# Patient Record
Sex: MALE | Race: WHITE | NOT HISPANIC OR LATINO | Employment: FULL TIME | URBAN - METROPOLITAN AREA
[De-identification: names, ages, dates, MRNs, and addresses within clinical notes are randomized per-mention and may not be internally consistent; named-entity substitution may affect disease eponyms.]

---

## 2017-11-22 DIAGNOSIS — Z00.00 ENCOUNTER FOR GENERAL ADULT MEDICAL EXAMINATION WITHOUT ABNORMAL FINDINGS: ICD-10-CM

## 2017-11-22 DIAGNOSIS — Z12.5 ENCOUNTER FOR SCREENING FOR MALIGNANT NEOPLASM OF PROSTATE: ICD-10-CM

## 2017-11-22 DIAGNOSIS — E78.5 HYPERLIPIDEMIA: ICD-10-CM

## 2017-12-05 ENCOUNTER — ALLSCRIPTS OFFICE VISIT (OUTPATIENT)
Dept: OTHER | Facility: OTHER | Age: 52
End: 2017-12-05

## 2018-01-16 NOTE — PROGRESS NOTES
Assessment    1  Encounter for preventive health examination (V70 0) (Z00 00)   2  Encounter for screening colonoscopy (V76 51) (Z12 11)   3  Umbilical hernia without obstruction and without gangrene (553 1) (K42 9)    Plan  Encounter for screening colonoscopy    · 1 - Radha CARTER, Haydee May (Gastroenterology) Physician Referral  Consult  Status: Active   Requested for: 81INJ9853  Care Summary provided  : Yes  Health Maintenance    · Always use a seat belt and shoulder strap when riding or driving a motor vehicle ;  Status:Complete;   Done: 64KDE1625   · Regular aerobic exercise can help reduce stress ; Status:Complete;   Done: 59NAO9269   · Use a sun block product with an SPF of 15 or more ; Status:Complete;   Done:  41Fyb5741   · We recommend a colonoscopy ; Status:Complete;   Done: 84CJO4901   · We recommend routine visits to a dentist ; Status:Complete;   Done: 49UWP2533   · Call [phone] if: You have any warning signs of skin cancer ; Status:Complete;    Done: 03UWJ7614    Discussion/Summary  Impression: health maintenance visit  Currently, he eats a healthy diet  Prostate cancer screening: the risks and benefits of prostate cancer screening were discussed and prostate cancer screening is current  Testicular cancer screening: the risks and benefits of testicular cancer screening were discussed, monthly self testicular exam was advised and testicular cancer screening is current  Colorectal cancer screening: the risks and benefits of colorectal cancer screening were discussed and colonoscopy has been ordered  The immunizations are up to date  Advice and education were given regarding nutrition and sunscreen use  Patient discussion: discussed with the patient  REFER FOR COLONOSCOPY  MONITOR UMBILICAL HERNIA, TO ER WITH ANY S/S OF INCARCERATION     Chief Complaint  patient presents today for a wellness        History of Present Illness  HM, Adult Male: The patient is being seen for a health maintenance evaluation  The last health maintenance visit was 1 year(s) ago  Social History: Household members include spouse  He is   Work status: working full time  The patient has never smoked cigarettes  He reports occasional alcohol use  He has never used illicit drugs  General Health: The patient's health since the last visit is described as good  He has regular dental visits  He denies vision problems  He denies hearing loss  Immunizations status: up to date  Lifestyle:  He consumes a diverse and healthy diet  He does not have any weight concerns  He exercises regularly  He does not use tobacco  He consumes alcohol  He denies drug use  Reproductive health:  the patient is sexually active  birth control is not being practiced  He denies erectile dysfunction  Screening: Prostate cancer screening includes last prostate-specific antigen testing 9/2016  Testicular cancer screening includes monthly self testicular examinations  Colorectal cancer screening includes no previous screening  Metabolic screening includes lipid profile performed 9/2016, glucose screening performed 9/2016, thyroid function test performed 9/2016 and no previous DEXA  Safety elements used: seat belt and smoke detector  HPI: [address]   FEELS WELL   LABS REVIEWED WITH PT       Review of Systems    Constitutional: No fever or chills, feels well, no tiredness, no recent weight gain or weight loss  Eyes: No complaints of eye pain, no red eyes, no discharge from eyes, no itchy eyes  ENT: no complaints of earache, no hearing loss, no nosebleeds, no nasal discharge, no sore throat, no hoarseness  Cardiovascular: No complaints of slow heart rate, no fast heart rate, no chest pain, no palpitations, no leg claudication, no lower extremity  Respiratory: No complaints of shortness of breath, no wheezing, no cough, no SOB on exertion, no orthopnea or PND     Gastrointestinal: No complaints of abdominal pain, no constipation, no nausea or vomiting, no diarrhea or bloody stools  Genitourinary: No complaints of dysuria, no incontinence, no hesitancy, no nocturia, no genital lesion, no testicular pain  Musculoskeletal: No complaints of arthralgia, no myalgias, no joint swelling or stiffness, no limb pain or swelling  Integumentary: No complaints of skin rash or skin lesions, no itching, no skin wound, no dry skin  Neurological: No compliants of headache, no confusion, no convulsions, no numbness or tingling, no dizziness or fainting, no limb weakness, no difficulty walking  Psychiatric: Is not suicidal, no sleep disturbances, no anxiety or depression, no change in personality, no emotional problems  Endocrine: No complaints of proptosis, no hot flashes, no muscle weakness, no erectile dysfunction, no deepening of the voice, no feelings of weakness  Hematologic/Lymphatic: No complaints of swollen glands, no swollen glands in the neck, does not bleed easily, no easy bruising  Active Problems    1  Encounter for screening colonoscopy (V76 51) (Z12 11)   2  Hyperlipidemia (272 4) (E78 5)   3  Vitamin D deficiency (268 9) (E55 9)    Past Medical History    · History of acute sinusitis (V12 69) (Z87 09)   · History of peptic ulcer (V12 71) (Z87 11)    Surgical History    · History of Lipectomy    Family History  Mother    · No pertinent family history  Father    · Family history of cerebrovascular accident (V17 1) (Z82 3)    Social History    · Alcohol use   · Never a smoker   · Occasional alcohol use    Current Meds   1  No Reported Medications Recorded    Allergies    1  No Known Drug Allergies    2  No Known Environmental Allergies   3   No Known Food Allergies    Vitals   Recorded: 68NRM1057 72:54ED   Systolic 387   Diastolic 76   Heart Rate 69   Respiration 16   Temperature 98 5 F   O2 Saturation 97   Height 5 ft 9 1 in   Weight 188 lb 0 6 oz   BMI Calculated 27 69   BSA Calculated 2 01     Physical Exam    Constitutional   General appearance: No acute distress, well appearing and well nourished  Head and Face   Head and face: Normal     Eyes   Conjunctiva and lids: No erythema, swelling or discharge  Pupils and irises: Equal, round, reactive to light  Ears, Nose, Mouth, and Throat   External inspection of ears and nose: Normal     Otoscopic examination: Tympanic membranes translucent with normal light reflex  Canals patent without erythema  Nasal mucosa, septum, and turbinates: Normal without edema or erythema  Lips, teeth, and gums: Normal, good dentition  Oropharynx: Normal with no erythema, edema, exudate or lesions  Neck   Neck: Supple, symmetric, trachea midline, no masses  Thyroid: Normal, no thyromegaly  Pulmonary   Respiratory effort: No increased work of breathing or signs of respiratory distress  Auscultation of lungs: Clear to auscultation  Cardiovascular   Auscultation of heart: Normal rate and rhythm, normal S1 and S2, no murmurs  Carotid pulses: 2+ bilaterally  Examination of extremities for edema and/or varicosities: Normal     Chest   Chest: Normal     Abdomen   Abdomen: Non-tender, no masses  Liver and spleen: No hepatomegaly or splenomegaly  Examination for hernias: Abnormal   A(n) reducible umbilical hernia was palpated  Lymphatic   Palpation of lymph nodes in neck: No lymphadenopathy  Palpation of lymph nodes in axillae: No lymphadenopathy  Musculoskeletal   Gait and station: Normal     Inspection/palpation of digits and nails: Normal without clubbing or cyanosis  Inspection/palpation of joints, bones, and muscles: Normal     Range of motion: Normal     Stability: Normal     Muscle strength/tone: Normal     Skin   Skin and subcutaneous tissue: Normal without rashes or lesions  Palpation of skin and subcutaneous tissue: Normal turgor  Neurologic   Cranial nerves: Cranial nerves 2-12 intact      Cortical function: Normal mental status  Reflexes: 2+ and symmetric  Sensation: No sensory loss  Coordination: Normal finger to nose and heel to shin  Psychiatric   Judgment and insight: Normal     Orientation to person, place and time: Normal     Recent and remote memory: Intact  Mood and affect: Normal        Results/Data  PHQ-2 Adult Depression Screening [id number] 04:04PM User, s     Test Name Result Flag Reference   PHQ-2 Adult Depression Score 0     Over the last two weeks, how often have you been bothered by any of the following problems?   Little interest or pleasure in doing things: Not at all - 0  Feeling down, depressed, or hopeless: Not at all - 0   PHQ-2 Adult Depression Screening Negative         Signatures   Electronically signed by : PRASANNA Hong; Sep 22 2016  9:22PM EST                       (Author)    Electronically signed by : BRENDA Marroquin ; Sep 22 2016 10:05PM EST                       (Author)

## 2018-01-16 NOTE — RESULT NOTES
Verified Results  (1) TISSUE EXAM [id number] 07:51AM Albertina Rojo     Test Name Result Flag Reference   LAB AP CASE REPORT (Report)     Surgical Pathology Report             Case: [id number]                   Authorizing Provider: Yuridia Huertas MD      Collected:      11/14/2016 0751        Ordering Location:   [facility]    Received:      11/15/2016 [address] [facility] [address] Endoscopy                            Pathologist:      Daniel Dooley MD                                 Specimen:  Large Intestine, Cecum, cold bx, cecum polyp   LAB AP FINAL DIAGNOSIS      A  Cecum polyp (biopsy):  - Tubular adenoma  - No high grade dysplasia    Interpretation performed at [place] [address]    Electronically signed by Daniel Dooley MD on 11/17/2016 at 3:49 PM   LAB AP SURGICAL ADDITIONAL INFORMATION (Report)     These tests were developed and their performance characteristics   determined by [facility] or [facility]  They may not be cleared or approved by the [place]  Food and   Drug Administration  The FDA has determined that such clearance or   approval is not necessary  These tests are used for clinical purposes  They should not be regarded as investigational or for research  This   laboratory has been approved by [place] 88, designated as a high-complexity   laboratory and is qualified to perform these tests  LAB AP GROSS DESCRIPTION (Report)     A  The specimen is received in formalin, labeled with the patient's name   and medical record number, and is designated cecum polyp biopsy  The   specimen consists of one tan-pink soft tissue fragment measuring 0 3 cm in   greatest dimension  Entirely submitted  One cassette  Note: The estimated total formalin fixation time based upon information   provided by the submitting clinician and the standard processing schedule   is 44 75 hours    MAS

## 2018-01-23 VITALS
RESPIRATION RATE: 16 BRPM | OXYGEN SATURATION: 99 % | WEIGHT: 190.01 LBS | DIASTOLIC BLOOD PRESSURE: 70 MMHG | SYSTOLIC BLOOD PRESSURE: 128 MMHG | HEIGHT: 69 IN | HEART RATE: 68 BPM | BODY MASS INDEX: 28.14 KG/M2

## 2018-06-01 ENCOUNTER — TELEPHONE (OUTPATIENT)
Dept: INTERNAL MEDICINE CLINIC | Facility: CLINIC | Age: 53
End: 2018-06-01
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
## 2018-06-04 ENCOUNTER — APPOINTMENT (OUTPATIENT)
Dept: LAB | Facility: CLINIC | Age: 53
End: 2018-06-04
Payer: COMMERCIAL

## 2018-06-04 ENCOUNTER — TRANSCRIBE ORDERS (OUTPATIENT)
Dept: LAB | Facility: CLINIC | Age: 53
End: 2018-06-04
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
## 2018-06-04 PROCEDURE — 36415 COLL VENOUS BLD VENIPUNCTURE: CPT

## 2018-06-04 PROCEDURE — 86617 LYME DISEASE ANTIBODY: CPT

## 2018-06-06 LAB
B BURGDOR IGG PATRN SER IB-IMP: NEGATIVE
B BURGDOR IGM PATRN SER IB-IMP: NEGATIVE
B BURGDOR18KD IGG SER QL IB: ABNORMAL
B BURGDOR23KD IGG SER QL IB: ABNORMAL
B BURGDOR23KD IGM SER QL IB: ABNORMAL
B BURGDOR28KD IGG SER QL IB: ABNORMAL
B BURGDOR30KD IGG SER QL IB: ABNORMAL
B BURGDOR39KD IGG SER QL IB: ABNORMAL
B BURGDOR39KD IGM SER QL IB: ABNORMAL
B BURGDOR41KD IGG SER QL IB: PRESENT
B BURGDOR41KD IGM SER QL IB: ABNORMAL
B BURGDOR45KD IGG SER QL IB: ABNORMAL
B BURGDOR58KD IGG SER QL IB: ABNORMAL
B BURGDOR66KD IGG SER QL IB: ABNORMAL
B BURGDOR93KD IGG SER QL IB: ABNORMAL

## 2018-09-09 ENCOUNTER — OFFICE VISIT (OUTPATIENT)
Dept: URGENT CARE | Facility: MEDICAL CENTER | Age: 53
End: 2018-09-09
Payer: COMMERCIAL

## 2018-09-09 VITALS
TEMPERATURE: 98.5 F | HEART RATE: 72 BPM | HEIGHT: 69 IN | DIASTOLIC BLOOD PRESSURE: 80 MMHG | SYSTOLIC BLOOD PRESSURE: 112 MMHG | BODY MASS INDEX: 26.96 KG/M2 | RESPIRATION RATE: 16 BRPM | WEIGHT: 182 LBS

## 2018-09-09 DIAGNOSIS — H60.311 ACUTE DIFFUSE OTITIS EXTERNA OF RIGHT EAR: Primary | ICD-10-CM

## 2018-09-09 PROCEDURE — G0382 LEV 3 HOSP TYPE B ED VISIT: HCPCS | Performed by: PHYSICIAN ASSISTANT

## 2018-09-09 RX ORDER — NEOMYCIN SULFATE, POLYMYXIN B SULFATE AND HYDROCORTISONE 10; 3.5; 1 MG/ML; MG/ML; [USP'U]/ML
4 SUSPENSION/ DROPS AURICULAR (OTIC) 4 TIMES DAILY
Qty: 10 ML | Refills: 0 | Status: SHIPPED | OUTPATIENT
Start: 2018-09-09 | End: 2019-09-18 | Stop reason: ALTCHOICE

## 2018-09-09 NOTE — PATIENT INSTRUCTIONS
1  Use cortisporin 2-3 drops into right ear 4x daily x 7 days  2  Motrin as needed for pain  3   Follow up with PCP in 3-5 days if symptoms persist

## 2018-09-09 NOTE — PROGRESS NOTES
[facility]        NAME: Rm Wise is a 47 y o  male  : 1965    MRN: [id number]  DATE: 2018  TIME: 10:06 AM    Assessment and Plan   Acute diffuse otitis externa of right ear [H60 311]  1  Acute diffuse otitis externa of right ear  neomycin-polymyxin-hydrocortisone (CORTISPORIN) 0 35%-10,000 units/mL-1% otic suspension         Patient Instructions     1  Use cortisporin 2-3 drops into right ear 4x daily x 7 days  2  Motrin as needed for pain  3  Follow up with PCP in 3-5 days if symptoms persist       Chief Complaint     Chief Complaint   Patient presents with    Earache     [place], next day R ear pain, started Sudafed and ear drops from pharmacy  Getting worse  History of Present Illness       The patient is a 50-year-old male presents with the right-sided ear pain x4 days  He denies any fever or ear discharge, he states his symptoms [place]  Review of Systems   Review of Systems   Constitutional: Negative  HENT: Positive for ear pain  Negative for ear discharge  Respiratory: Negative  Gastrointestinal: Negative            Current Medications       Current Outpatient Prescriptions:     neomycin-polymyxin-hydrocortisone (CORTISPORIN) 0 35%-10,000 units/mL-1% otic suspension, Administer 4 drops to the right ear 4 (four) times a day for 7 days, Disp: 10 mL, Rfl: 0    Current Allergies     Allergies as of 2018    (No Known Allergies)            The following portions of the patient's history were reviewed and updated as appropriate: allergies, current medications, past family history, past medical history, past social history, past surgical history and problem list      Past Medical History:   Diagnosis Date    Hyperlipidemia     Peptic ulcer     LAST ASSESSED: 14    Vitamin D deficiency     LAST ASSESSED: 9/17/15       Past Surgical History:   Procedure Laterality Date    BACK SURGERY      LIPECTOMY      L5; LAST ASSESSED: 9/11/14    MN COLONOSCOPY FLX DX W/COLLJ SPEC WHEN PFRMD N/A 11/14/2016    Procedure: COLONOSCOPY;  Surgeon: Dillan Guadarrama MD;  Location: [facility]; Service: Gastroenterology       Family History   Problem Relation Age of Onset    Stroke Father         CEREBROVASCULAR ACCIDENT         Medications have been verified  Objective   /80 (Patient Position: Sitting)   Pulse 72   Temp 98 5 °F (36 9 °C) (Tympanic)   Resp 16   Ht 5' 9" (1 753 m)   Wt 82 6 kg (182 lb)   BMI 26 88 kg/m²        Physical Exam     Physical Exam   Constitutional: He appears well-developed and well-nourished  No distress  HENT:   Head: Normocephalic and atraumatic  Left Ear: Tympanic membrane normal    Ears:    Nose: Nose normal    Mouth/Throat: Uvula is midline, oropharynx is clear and moist and mucous membranes are normal    Cardiovascular: Normal rate, regular rhythm and normal heart sounds  No murmur heard    Pulmonary/Chest: Effort normal and breath sounds normal

## 2018-09-11 ENCOUNTER — OFFICE VISIT (OUTPATIENT)
Dept: INTERNAL MEDICINE CLINIC | Facility: CLINIC | Age: 53
End: 2018-09-11
Payer: COMMERCIAL

## 2018-09-11 VITALS
HEIGHT: 69 IN | OXYGEN SATURATION: 98 % | RESPIRATION RATE: 16 BRPM | DIASTOLIC BLOOD PRESSURE: 78 MMHG | BODY MASS INDEX: 26.96 KG/M2 | SYSTOLIC BLOOD PRESSURE: 128 MMHG | WEIGHT: 182 LBS | HEART RATE: 82 BPM

## 2018-09-11 DIAGNOSIS — H66.90 ACUTE OTITIS MEDIA, UNSPECIFIED OTITIS MEDIA TYPE: Primary | ICD-10-CM

## 2018-09-11 PROCEDURE — 99213 OFFICE O/P EST LOW 20 MIN: CPT | Performed by: NURSE PRACTITIONER

## 2018-09-11 PROCEDURE — 3008F BODY MASS INDEX DOCD: CPT | Performed by: NURSE PRACTITIONER

## 2018-09-11 RX ORDER — METHYLPREDNISOLONE 4 MG/1
TABLET ORAL
Qty: 21 TABLET | Refills: 0 | Status: SHIPPED | OUTPATIENT
Start: 2018-09-11 | End: 2018-11-13 | Stop reason: ALTCHOICE

## 2018-09-11 RX ORDER — AMOXICILLIN AND CLAVULANATE POTASSIUM 875; 125 MG/1; MG/1
1 TABLET, FILM COATED ORAL EVERY 12 HOURS SCHEDULED
Qty: 14 TABLET | Refills: 0 | Status: SHIPPED | OUTPATIENT
Start: 2018-09-11 | End: 2018-09-18

## 2018-09-11 NOTE — PROGRESS NOTES
Assessment/Plan:    No problem-specific Assessment & Plan notes found for this encounter  Diagnoses and all orders for this visit:    Acute otitis media, unspecified otitis media type  -     amoxicillin-clavulanate (AUGMENTIN) 875-125 mg per tablet; Take 1 tablet by mouth every 12 (twelve) hours for 7 days  -     Methylprednisolone 4 MG TBPK; Use as directed on package          Subjective:      Patient ID: Val Cuevas is a 47 y o  male  Last Wednesday started with ear ache  Tried swimmers ear drop    Pain radiating to neck and head  Prescribed cortisporin ear drops not working        The following portions of the patient's history were reviewed and updated as appropriate: allergies, current medications, past family history, past medical history, past social history, past surgical history and problem list     Review of Systems   Constitutional: Negative  HENT: Positive for ear pain  Eyes: Negative  Respiratory: Negative  Cardiovascular: Negative  Gastrointestinal: Negative  Musculoskeletal: Negative  Neurological: Negative  Objective:      /78 (BP Location: Left arm, Patient Position: Sitting, Cuff Size: Standard)   Pulse 82   Resp 16   Ht 5' 9" (1 753 m)   Wt 82 6 kg (182 lb)   SpO2 98%   BMI 26 88 kg/m²          Physical Exam   Constitutional: He is oriented to person, place, and time  He appears well-developed and well-nourished  HENT:   Head: Normocephalic and atraumatic  Eyes: Conjunctivae are normal  Pupils are equal, round, and reactive to light  Neck: Normal range of motion  Neck supple  Cardiovascular: Normal rate and regular rhythm  Pulmonary/Chest: Effort normal and breath sounds normal    Abdominal: Soft  Bowel sounds are normal    Musculoskeletal: Normal range of motion  Neurological: He is alert and oriented to person, place, and time  Skin: Skin is warm and dry

## 2018-09-14 ENCOUNTER — TELEPHONE (OUTPATIENT)
Dept: INTERNAL MEDICINE CLINIC | Facility: CLINIC | Age: 53
End: 2018-09-14

## 2018-09-14 DIAGNOSIS — H92.01 RIGHT EAR PAIN: Primary | ICD-10-CM

## 2018-09-14 NOTE — TELEPHONE ENCOUNTER
Pt called in stating that the pain and redness in the ear canal has since stopped however he still cannot hear  He wants to know what he can do for that or will finishing the antibiotic help with that?  Please advise, ty

## 2018-11-13 ENCOUNTER — OFFICE VISIT (OUTPATIENT)
Dept: INTERNAL MEDICINE CLINIC | Facility: CLINIC | Age: 53
End: 2018-11-13
Payer: COMMERCIAL

## 2018-11-13 VITALS
OXYGEN SATURATION: 97 % | DIASTOLIC BLOOD PRESSURE: 92 MMHG | BODY MASS INDEX: 26.88 KG/M2 | HEART RATE: 88 BPM | HEIGHT: 69 IN | SYSTOLIC BLOOD PRESSURE: 140 MMHG

## 2018-11-13 DIAGNOSIS — J01.00 ACUTE NON-RECURRENT MAXILLARY SINUSITIS: Primary | ICD-10-CM

## 2018-11-13 PROCEDURE — 1036F TOBACCO NON-USER: CPT | Performed by: NURSE PRACTITIONER

## 2018-11-13 PROCEDURE — 99214 OFFICE O/P EST MOD 30 MIN: CPT | Performed by: NURSE PRACTITIONER

## 2018-11-13 RX ORDER — AMOXICILLIN AND CLAVULANATE POTASSIUM 875; 125 MG/1; MG/1
1 TABLET, FILM COATED ORAL EVERY 12 HOURS SCHEDULED
Qty: 14 TABLET | Refills: 0 | Status: SHIPPED | OUTPATIENT
Start: 2018-11-13 | End: 2018-11-20

## 2018-11-13 NOTE — PROGRESS NOTES
Assessment/Plan:     Diagnoses and all orders for this visit:    Acute non-recurrent maxillary sinusitis  -     amoxicillin-clavulanate (AUGMENTIN) 875-125 mg per tablet; Take 1 tablet by mouth every 12 (twelve) hours for 7 days        Drink plenty of fluids and rest  May take over the counter mucinex and cough syrup/cough drops  Call if worsening  Subjective:      Patient ID: Misa Self is a 47 y o  male  Sinusitis   This is a new problem  The current episode started in the past 7 days  The problem is unchanged  There has been no fever  Associated symptoms include congestion, sinus pressure and a sore throat  The following portions of the patient's history were reviewed and updated as appropriate: allergies, current medications, past family history, past medical history, past social history, past surgical history and problem list     Review of Systems   Constitutional: Negative  HENT: Positive for congestion, sinus pain, sinus pressure and sore throat  Eyes: Negative  Respiratory: Negative  Cardiovascular: Negative  Gastrointestinal: Negative  Musculoskeletal: Negative  Neurological: Negative  Family History   Problem Relation Age of Onset    Stroke Father         CEREBROVASCULAR ACCIDENT     Past Medical History:   Diagnosis Date    Hyperlipidemia     Peptic ulcer     LAST ASSESSED: 9/11/14    Vitamin D deficiency     LAST ASSESSED: 9/17/15     Social History     Social History    Marital status: /Civil Union     Spouse name: N/A    Number of children: N/A    Years of education: N/A     Occupational History    Not on file       Social History Main Topics    Smoking status: Never Smoker    Smokeless tobacco: Never Used    Alcohol use Yes      Comment: occasional     Drug use: No    Sexual activity: Not on file     Other Topics Concern    Not on file     Social History Narrative    No narrative on file       Current Outpatient Prescriptions:    amoxicillin-clavulanate (AUGMENTIN) 875-125 mg per tablet, Take 1 tablet by mouth every 12 (twelve) hours for 7 days, Disp: 14 tablet, Rfl: 0    neomycin-polymyxin-hydrocortisone (CORTISPORIN) 0 35%-10,000 units/mL-1% otic suspension, Administer 4 drops to the right ear 4 (four) times a day for 7 days, Disp: 10 mL, Rfl: 0  No Known Allergies      Objective:      /92 (BP Location: Left arm, Patient Position: Sitting, Cuff Size: Large)   Pulse 88   Ht 5' 9" (1 753 m)   SpO2 97%   BMI 26 88 kg/m²          Physical Exam   Constitutional: He is oriented to person, place, and time  He appears well-developed and well-nourished  HENT:   Head: Normocephalic and atraumatic  Eyes: Pupils are equal, round, and reactive to light  Conjunctivae are normal    Neck: Normal range of motion  Neck supple  Cardiovascular: Normal rate and regular rhythm  Pulmonary/Chest: Effort normal and breath sounds normal    Abdominal: Soft  Bowel sounds are normal    Musculoskeletal: Normal range of motion  Neurological: He is alert and oriented to person, place, and time  Skin: Skin is warm and dry

## 2018-12-06 ENCOUNTER — OFFICE VISIT (OUTPATIENT)
Dept: INTERNAL MEDICINE CLINIC | Facility: CLINIC | Age: 53
End: 2018-12-06
Payer: COMMERCIAL

## 2018-12-06 VITALS
DIASTOLIC BLOOD PRESSURE: 80 MMHG | TEMPERATURE: 98.3 F | HEART RATE: 72 BPM | BODY MASS INDEX: 27.56 KG/M2 | SYSTOLIC BLOOD PRESSURE: 126 MMHG | RESPIRATION RATE: 12 BRPM | WEIGHT: 186.6 LBS | OXYGEN SATURATION: 99 %

## 2018-12-06 DIAGNOSIS — Z13.6 SCREENING FOR CARDIOVASCULAR CONDITION: Primary | ICD-10-CM

## 2018-12-06 DIAGNOSIS — E55.9 VITAMIN D DEFICIENCY: ICD-10-CM

## 2018-12-06 DIAGNOSIS — Z12.5 SCREENING PSA (PROSTATE SPECIFIC ANTIGEN): ICD-10-CM

## 2018-12-06 DIAGNOSIS — Z00.00 WELLNESS EXAMINATION: ICD-10-CM

## 2018-12-06 PROCEDURE — 99396 PREV VISIT EST AGE 40-64: CPT | Performed by: INTERNAL MEDICINE

## 2018-12-06 NOTE — ASSESSMENT & PLAN NOTE
Assessment and plan 1  Health maintenance annual wellness examination overall the patient is clinically stable and doing well, we encouraged the patient to follow a healthy and balanced diet  We recommend that the patient exercise routinely approximately 30 minutes 5 times per week   We have reviewed the patient's vaccines and have made recommendations for updates if necessary pt wants to hold off on the flu shot , we did  the pt       We will be ordering screening laboratories which are age appropriate  Return to the office in 12months     call if any problems

## 2018-12-06 NOTE — PROGRESS NOTES
NAME: Abdelrahman Miller  AGE: 47 y o  SEX: male  : 1965     DATE: 2018    Assessment and Plan     Problem List Items Addressed This Visit        Other    Wellness examination     Assessment and plan 1  Health maintenance annual wellness examination overall the patient is clinically stable and doing well, we encouraged the patient to follow a healthy and balanced diet  We recommend that the patient exercise routinely approximately 30 minutes 5 times per week   We have reviewed the patient's vaccines and have made recommendations for updates if necessary pt wants to hold off on the flu shot , we did  the pt       We will be ordering screening laboratories which are age appropriate  Return to the office in 12months     call if any problems  Other Visit Diagnoses     Screening for cardiovascular condition    -  Primary    Relevant Orders    Comprehensive metabolic panel    Lipid Panel with Direct LDL reflex    Screening PSA (prostate specific antigen)        Relevant Orders    PSA, Total Screen    Vitamin D deficiency        Relevant Orders    Vitamin D 25 hydroxy        Sept camping double ear infection and just getting over sinus infection; he limits red meats ,  In the past low vit d level     · Patient Counseling:   · Nutrition: Stressed importance of a well balanced diet, moderation of sodium/saturated fat, caloric balance and sufficient intake of fiber  · Exercise: Stressed the importance of regular exercise with a goal of 150 minutes per week walking , weight lifting 4/week , and active life style  Dental Health: Discussed daily flossing and brushing and regular dental visits     · Immunizations reviewed y  · Discussed benefits of screening yes  · Discussed the patient's BMI with him  The BMI is above average; BMI management plan is completed     No Follow-up on file      Return to office 12  months  call if any problems    Chief Complaint     Chief Complaint   Patient presents with    Annual Exam       History of Present Illness     HPI    Well Adult Physical   Patient here for a comprehensive physical exam       Diet and Physical Activity  Diet: well balanced diet  Weight concerns: Patient is overweight (BMI 25 0-29  9)  Exercise: frequently      Depression Screen  PHQ-9 Depression Screening    PHQ-9:    Frequency of the following problems over the past two weeks:               General Health  Hearing: Normal:  Vision: no vision problems  Dental: regular dental visits    Reproductive Health        The following portions of the patient's history were reviewed and updated as appropriate: allergies, current medications, past family history, past medical history, past social history, past surgical history and problem list     Review of Systems     Review of Systems    Past Medical History     Past Medical History:   Diagnosis Date    Hyperlipidemia     Peptic ulcer     LAST ASSESSED: 9/11/14    Vitamin D deficiency     LAST ASSESSED: 9/17/15       Past Surgical History     Past Surgical History:   Procedure Laterality Date    BACK SURGERY      LIPECTOMY      L5; LAST ASSESSED: 9/11/14    OR COLONOSCOPY FLX DX W/COLLJ SPEC WHEN PFRMD N/A 11/14/2016    Procedure: COLONOSCOPY;  Surgeon: Ada Barreto MD;  Location: [facility];   Service: Gastroenterology       Social History     Social History     Social History    Marital status: /Civil Union     Spouse name: N/A    Number of children: N/A    Years of education: N/A     Social History Main Topics    Smoking status: Never Smoker    Smokeless tobacco: Never Used    Alcohol use Yes      Comment: occasional     Drug use: No    Sexual activity: Not on file     Other Topics Concern    Not on file     Social History Narrative    No narrative on file       Family History     Family History   Problem Relation Age of Onset    Stroke Father         CEREBROVASCULAR ACCIDENT       Current Medications       Current Outpatient Prescriptions:     neomycin-polymyxin-hydrocortisone (CORTISPORIN) 0 35%-10,000 units/mL-1% otic suspension, Administer 4 drops to the right ear 4 (four) times a day for 7 days, Disp: 10 mL, Rfl: 0     Allergies     No Known Allergies    Objective     /80 (BP Location: Left arm, Patient Position: Sitting, Cuff Size: Standard)   Pulse 72   Temp 98 3 °F (36 8 °C) (Oral)   Resp 12   Wt 84 6 kg (186 lb 9 6 oz)   SpO2 99%   BMI 27 56 kg/m²      Physical Exam   Constitutional: He appears well-developed and well-nourished  No distress  HENT:   Head: Normocephalic and atraumatic  Right Ear: External ear normal    Left Ear: External ear normal    Mouth/Throat: Oropharynx is clear and moist    Eyes: Pupils are equal, round, and reactive to light  Conjunctivae are normal  Right eye exhibits no discharge  Left eye exhibits no discharge  No scleral icterus  Neck: Neck supple  Cardiovascular: Normal rate, regular rhythm and normal heart sounds  Exam reveals no gallop and no friction rub  No murmur heard  Pulmonary/Chest: No respiratory distress  He has no wheezes  He has no rales  Abdominal: Soft  Bowel sounds are normal  He exhibits no distension and no mass  There is no tenderness  There is no rebound and no guarding  Musculoskeletal: He exhibits no edema or deformity  Lymphadenopathy:     He has no cervical adenopathy  Neurological: He is alert  Skin: He is not diaphoretic  Psychiatric: He has a normal mood and affect           No exam data present    Health Maintenance     Health Maintenance   Topic Date Due    INFLUENZA VACCINE  12/06/2019 (Originally 7/1/2018)    Depression Screening PHQ  09/09/2019    CRC Screening: Colonoscopy  11/14/2021    DTaP,Tdap,and Td Vaccines (2 - Td) 12/05/2027     Immunization History   Administered Date(s) Administered    Influenza Quadrivalent Preservative Free 3 years and older IM 12/05/2017    Influenza TIV (IM) 03/13/2011    Tdap 12/05/2017 Marisel Montgomery DO  [facility]

## 2018-12-19 DIAGNOSIS — R97.20 ELEVATED PSA: ICD-10-CM

## 2018-12-19 DIAGNOSIS — R79.89 LOW VITAMIN D LEVEL: Primary | ICD-10-CM

## 2019-05-02 ENCOUNTER — OFFICE VISIT (OUTPATIENT)
Dept: UROLOGY | Facility: AMBULATORY SURGERY CENTER | Age: 54
End: 2019-05-02
Payer: COMMERCIAL

## 2019-05-02 VITALS
DIASTOLIC BLOOD PRESSURE: 82 MMHG | SYSTOLIC BLOOD PRESSURE: 130 MMHG | BODY MASS INDEX: 25.86 KG/M2 | HEIGHT: 70 IN | HEART RATE: 76 BPM | WEIGHT: 180.6 LBS

## 2019-05-02 DIAGNOSIS — R97.20 ELEVATED PSA: ICD-10-CM

## 2019-05-02 PROCEDURE — 99204 OFFICE O/P NEW MOD 45 MIN: CPT | Performed by: UROLOGY

## 2019-05-02 RX ORDER — CHOLECALCIFEROL (VITAMIN D3) 50 MCG
2000 TABLET ORAL DAILY
COMMUNITY

## 2019-09-18 ENCOUNTER — OFFICE VISIT (OUTPATIENT)
Dept: INTERNAL MEDICINE CLINIC | Facility: CLINIC | Age: 54
End: 2019-09-18
Payer: COMMERCIAL

## 2019-09-18 VITALS
BODY MASS INDEX: 26.11 KG/M2 | RESPIRATION RATE: 16 BRPM | TEMPERATURE: 100.1 F | OXYGEN SATURATION: 95 % | DIASTOLIC BLOOD PRESSURE: 80 MMHG | HEIGHT: 70 IN | HEART RATE: 96 BPM | SYSTOLIC BLOOD PRESSURE: 128 MMHG | WEIGHT: 182.4 LBS

## 2019-09-18 DIAGNOSIS — J01.00 ACUTE NON-RECURRENT MAXILLARY SINUSITIS: Primary | ICD-10-CM

## 2019-09-18 PROCEDURE — 99213 OFFICE O/P EST LOW 20 MIN: CPT | Performed by: NURSE PRACTITIONER

## 2019-09-18 RX ORDER — AMOXICILLIN AND CLAVULANATE POTASSIUM 875; 125 MG/1; MG/1
1 TABLET, FILM COATED ORAL EVERY 12 HOURS SCHEDULED
Qty: 14 TABLET | Refills: 0 | Status: SHIPPED | OUTPATIENT
Start: 2019-09-18 | End: 2019-09-25

## 2019-09-18 NOTE — PROGRESS NOTES
Assessment/Plan:    Acute non-recurrent maxillary sinusitis  Drink plenty of fluids and rest  May take over the counter mucinex and cough syrup/cough drops  Call if worsening  Start antibiotics       Diagnoses and all orders for this visit:    Acute non-recurrent maxillary sinusitis  -     amoxicillin-clavulanate (AUGMENTIN) 875-125 mg per tablet; Take 1 tablet by mouth every 12 (twelve) hours for 7 days    Other orders  -     Cancel: influenza vaccine, [id number], quadrivalent, recombinant, PF, 0 5 mL, for patients 18 yr+ (FLUBLOK)          Subjective:      Patient ID: Jordyn Hutchinson is a 49 y o  male  Sinusitis   This is a new problem  The current episode started in the past 7 days  The problem is unchanged  The maximum temperature recorded prior to his arrival was 100 4 - 100 9 F  The fever has been present for 1 to 2 days  The pain is mild  Associated symptoms include coughing, ear pain and sinus pressure  The following portions of the patient's history were reviewed and updated as appropriate: allergies, current medications, past family history, past medical history, past social history, past surgical history and problem list     Review of Systems   Constitutional: Positive for fatigue and fever  HENT: Positive for ear pain, postnasal drip, rhinorrhea, sinus pressure and sinus pain  Eyes: Negative  Respiratory: Positive for cough  Cardiovascular: Negative  Gastrointestinal: Negative  Musculoskeletal: Negative  Neurological: Negative  Objective:      /80   Pulse 96   Temp 100 1 °F (37 8 °C) (Tympanic)   Resp 16   Ht 5' 9 5" (1 765 m)   Wt 82 7 kg (182 lb 6 4 oz)   SpO2 95%   BMI 26 55 kg/m²          Physical Exam   Constitutional: He is oriented to person, place, and time  He appears well-developed and well-nourished  HENT:   Head: Normocephalic and atraumatic     Right Ear: External ear normal    Left Ear: External ear normal    Nose: Nose normal    Mouth/Throat: Oropharynx is clear and moist    Eyes: Pupils are equal, round, and reactive to light  Conjunctivae are normal    Neck: Normal range of motion  Neck supple  Cardiovascular: Normal rate and regular rhythm  Pulmonary/Chest: Effort normal and breath sounds normal    Abdominal: Soft  Bowel sounds are normal    Musculoskeletal: Normal range of motion  Neurological: He is alert and oriented to person, place, and time  Skin: Skin is warm and dry  Nursing note and vitals reviewed

## 2019-09-25 ENCOUNTER — TELEPHONE (OUTPATIENT)
Dept: INTERNAL MEDICINE CLINIC | Facility: CLINIC | Age: 54
End: 2019-09-25

## 2019-09-25 DIAGNOSIS — J06.9 UPPER RESPIRATORY TRACT INFECTION, UNSPECIFIED TYPE: Primary | ICD-10-CM

## 2019-09-25 RX ORDER — DOXYCYCLINE HYCLATE 100 MG/1
100 CAPSULE ORAL EVERY 12 HOURS SCHEDULED
Qty: 14 CAPSULE | Refills: 0 | Status: SHIPPED | OUTPATIENT
Start: 2019-09-25 | End: 2019-10-03 | Stop reason: ALTCHOICE

## 2019-09-25 NOTE — TELEPHONE ENCOUNTER
Patient was in to see you 9/18 for cold symptoms he completed medications and he is still having symptoms wanted to know what is the next step?     Please advise

## 2019-10-02 ENCOUNTER — TELEPHONE (OUTPATIENT)
Dept: INTERNAL MEDICINE CLINIC | Facility: CLINIC | Age: 54
End: 2019-10-02

## 2019-10-02 NOTE — TELEPHONE ENCOUNTER
Pt called and said his sinus infection is still not any better  He finished his second round of antibiotics and there is no change to his condition  Please advise

## 2019-10-03 ENCOUNTER — OFFICE VISIT (OUTPATIENT)
Dept: INTERNAL MEDICINE CLINIC | Facility: CLINIC | Age: 54
End: 2019-10-03
Payer: COMMERCIAL

## 2019-10-03 VITALS
HEIGHT: 70 IN | TEMPERATURE: 98.6 F | RESPIRATION RATE: 16 BRPM | WEIGHT: 180 LBS | BODY MASS INDEX: 25.77 KG/M2 | SYSTOLIC BLOOD PRESSURE: 114 MMHG | DIASTOLIC BLOOD PRESSURE: 78 MMHG | OXYGEN SATURATION: 97 % | HEART RATE: 63 BPM

## 2019-10-03 DIAGNOSIS — J30.2 SEASONAL ALLERGIC RHINITIS, UNSPECIFIED TRIGGER: Primary | ICD-10-CM

## 2019-10-03 DIAGNOSIS — Z23 NEED FOR INFLUENZA VACCINATION: ICD-10-CM

## 2019-10-03 PROCEDURE — 3008F BODY MASS INDEX DOCD: CPT | Performed by: NURSE PRACTITIONER

## 2019-10-03 PROCEDURE — 99213 OFFICE O/P EST LOW 20 MIN: CPT | Performed by: NURSE PRACTITIONER

## 2019-10-03 NOTE — PROGRESS NOTES
Assessment/Plan:    Seasonal allergic rhinitis  No sign of infection  Finished two rounds of antibiotics  Start allergie med ie claritin otc and flonase       Diagnoses and all orders for this visit:    Seasonal allergic rhinitis, unspecified trigger    Need for influenza vaccination    Other orders  -     Cancel: influenza vaccine, [id number], quadrivalent, recombinant, PF, 0 5 mL, for patients 18 yr+ (FLUBLOK)          Subjective:      Patient ID: Wilda Romo is a 49 y o  male  Patient is here for continuous sinus congestion and cough  Ear congestion  finished zpak and doxycycline  He no longer feels sick but still congested and has green phlegm in the morning      The following portions of the patient's history were reviewed and updated as appropriate: allergies, current medications, past family history, past medical history, past social history, past surgical history and problem list     Review of Systems   Constitutional: Negative  HENT: Positive for congestion, postnasal drip, rhinorrhea and sinus pressure  Eyes: Negative  Respiratory: Positive for cough  Cardiovascular: Negative  Gastrointestinal: Negative  Musculoskeletal: Negative  Neurological: Negative  Objective:      /78   Pulse 63   Temp 98 6 °F (37 °C) (Oral)   Resp 16   Ht 5' 9 5" (1 765 m)   Wt 81 6 kg (180 lb)   SpO2 97%   BMI 26 20 kg/m²          Physical Exam   Constitutional: He is oriented to person, place, and time  He appears well-developed and well-nourished  HENT:   Head: Normocephalic and atraumatic  Right Ear: External ear normal    Left Ear: External ear normal    Nose: Nose normal    Mouth/Throat: Oropharynx is clear and moist    Eyes: Pupils are equal, round, and reactive to light  Conjunctivae are normal    Neck: Normal range of motion  Neck supple  Cardiovascular: Normal rate and regular rhythm  Pulmonary/Chest: Effort normal and breath sounds normal    Abdominal: Soft   Bowel sounds are normal    Musculoskeletal: Normal range of motion  Neurological: He is alert and oriented to person, place, and time  Skin: Skin is warm and dry  Nursing note and vitals reviewed

## 2019-10-06 NOTE — ASSESSMENT & PLAN NOTE
No sign of infection  Finished two rounds of antibiotics  Start allergie med ie claritin otc and flonase

## 2019-12-09 ENCOUNTER — OFFICE VISIT (OUTPATIENT)
Dept: INTERNAL MEDICINE CLINIC | Facility: CLINIC | Age: 54
End: 2019-12-09
Payer: COMMERCIAL

## 2019-12-09 VITALS
RESPIRATION RATE: 18 BRPM | SYSTOLIC BLOOD PRESSURE: 118 MMHG | BODY MASS INDEX: 26.27 KG/M2 | HEIGHT: 70 IN | TEMPERATURE: 98.8 F | OXYGEN SATURATION: 99 % | WEIGHT: 183.5 LBS | HEART RATE: 69 BPM | DIASTOLIC BLOOD PRESSURE: 80 MMHG

## 2019-12-09 DIAGNOSIS — E55.9 VITAMIN D DEFICIENCY: ICD-10-CM

## 2019-12-09 DIAGNOSIS — Z00.00 WELLNESS EXAMINATION: ICD-10-CM

## 2019-12-09 DIAGNOSIS — Z13.6 SCREENING FOR CARDIOVASCULAR CONDITION: Primary | ICD-10-CM

## 2019-12-09 DIAGNOSIS — E78.2 MODERATE MIXED HYPERLIPIDEMIA NOT REQUIRING STATIN THERAPY: ICD-10-CM

## 2019-12-09 PROCEDURE — 99396 PREV VISIT EST AGE 40-64: CPT | Performed by: INTERNAL MEDICINE

## 2019-12-09 NOTE — PROGRESS NOTES
Assessment/Plan:    Wellness examination  Assessment and plan 1  Health maintenance annual wellness examination overall the patient is clinically stable and doing well, we encouraged the patient to follow a healthy and balanced diet  We recommend that the patient exercise routinely approximately 30 minutes 5 times per week   We have reviewed the patient's vaccines and have made recommendations for updates if necessary  recommend annual flu shot, consider the new shingles vaccine      We will be ordering screening laboratories which are age appropriate  Return to the office in 6 months    call if any problems  Problem List Items Addressed This Visit        Other    Wellness examination     Assessment and plan 1  Health maintenance annual wellness examination overall the patient is clinically stable and doing well, we encouraged the patient to follow a healthy and balanced diet  We recommend that the patient exercise routinely approximately 30 minutes 5 times per week   We have reviewed the patient's vaccines and have made recommendations for updates if necessary  recommend annual flu shot, consider the new shingles vaccine      We will be ordering screening laboratories which are age appropriate  Return to the office in 6 months    call if any problems  Other Visit Diagnoses     Screening for cardiovascular condition    -  Primary    Relevant Orders    Comprehensive metabolic panel    Moderate mixed hyperlipidemia not requiring statin therapy        Relevant Orders    Lipid Panel with Direct LDL reflex    High sensitivity CRP    Vitamin D deficiency        Relevant Orders    Vitamin D 25 hydroxy        BMI Counseling: Body mass index is 26 71 kg/m²  The BMI is above normal  Nutrition recommendations include decreasing portion sizes  Exercise recommendations include exercising 3-5 times per week  Subjective:      Patient ID: Trice Appiah is a 49 y o  male      [facility] annual wellness examination, he attends routine dental examinations, eye examinations routinely, wears a seatbelt and drives a car safely  He does have a smoke alarm, carbon monoxide detector and also fire extinguisher within his house  He sees Dermatology annually for routine skin examination  Diet good,  Limit red meats eats salmon, vegetable , water adequate; exercize walking 3-4 tiems per week and weights 3 times per week and now biking    The following portions of the patient's history were reviewed and updated as appropriate: allergies, current medications, past family history, past medical history, past social history, past surgical history and problem list     Review of Systems   Constitutional: Negative for activity change, appetite change and unexpected weight change  HENT: Negative for congestion and postnasal drip  Eyes: Negative for visual disturbance  Respiratory: Negative for cough and shortness of breath  Cardiovascular: Negative for chest pain  Gastrointestinal: Negative for abdominal pain, diarrhea, nausea and vomiting  Neurological: Negative for dizziness, light-headedness and headaches  Hematological: Negative for adenopathy  Objective:    No follow-ups on file  No results found  No Known Allergies    Past Medical History:   Diagnosis Date    Hyperlipidemia     Peptic ulcer     LAST ASSESSED: 9/11/14    Vitamin D deficiency     LAST ASSESSED: 9/17/15     Past Surgical History:   Procedure Laterality Date    BACK SURGERY      LIPECTOMY      L5; LAST ASSESSED: 9/11/14    FL COLONOSCOPY FLX DX W/COLLJ SPEC WHEN PFRMD N/A 11/14/2016    Procedure: COLONOSCOPY;  Surgeon: Rivka Pacheco MD;  Location: [facility];   Service: Gastroenterology     Current Outpatient Medications on File Prior to Visit   Medication Sig Dispense Refill    Cholecalciferol (VITAMIN D) 2000 units tablet Take 2,000 Units by mouth daily       No current facility-administered medications on file prior to visit        Family History   Problem Relation Age of Onset    Stroke Father         CEREBROVASCULAR ACCIDENT     Social History     Socioeconomic History    Marital status: /Civil Union     Spouse name: Not on file    Number of children: Not on file    Years of education: Not on file    Highest education level: Not on file   Occupational History    Not on file   Social Needs    Financial resource strain: Not on file    Food insecurity:     Worry: Not on file     Inability: Not on file    Transportation needs:     Medical: Not on file     Non-medical: Not on file   Tobacco Use    Smoking status: Never Smoker    Smokeless tobacco: Never Used   Substance and Sexual Activity    Alcohol use: Yes     Frequency: 2-4 times a month     Drinks per session: 3 or 4     Comment: occasional     Drug use: No    Sexual activity: Not on file   Lifestyle    Physical activity:     Days per week: Not on file     Minutes per session: Not on file    Stress: Not on file   Relationships    Social connections:     Talks on phone: Not on file     Gets together: Not on file     Attends [employer] service: Not on file     Active member of club or organization: Not on file     Attends meetings of clubs or organizations: Not on file     Relationship status: Not on file    Intimate partner violence:     Fear of current or ex partner: Not on file     Emotionally abused: Not on file     Physically abused: Not on file     Forced sexual activity: Not on file   Other Topics Concern    Not on file   Social History Narrative    Not on file     Vitals:    12/09/19 0759   BP: 118/80   BP Location: Left arm   Patient Position: Sitting   Cuff Size: Adult   Pulse: 69   Resp: 18   Temp: 98 8 °F (37 1 °C)   TempSrc: Oral   SpO2: 99%   Weight: 83 2 kg (183 lb 8 oz)   Height: 5' 9 5" (1 765 m)     Results for orders placed or performed in visit on 06/04/18   Lyme disease, western blot   Result Value Ref Range    Lyme 18 kD IgG Absent Lyme 23 kD IgG Absent     Lyme 28 kD IgG Absent     Lyme 30 kD IgG Absent     Lyme 39 kD IgG Absent     Lyme 41 kD IgG Present (A)     Lyme 45 kD IgG Absent     Lyme 58 kD IgG Absent     Lyme 66 kD IgG Absent     Lyme 93 kD IgG Absent     Lyme 23 kD IgM Absent     Lyme 39 kD IgM Absent     Lyme 41 kD IgM Absent     Lyme IgG WB Interp  Negative     Lyme IgM WB Interp  Negative      Weight (last 2 days)     Date/Time   Weight    12/09/19 0759   83 2 (183 5)            Body mass index is 26 71 kg/m²  BP      Temp      Pulse     Resp      SpO2        Vitals:    12/09/19 0759   Weight: 83 2 kg (183 lb 8 oz)     Vitals:    12/09/19 0759   Weight: 83 2 kg (183 lb 8 oz)       /80 (BP Location: Left arm, Patient Position: Sitting, Cuff Size: Adult)   Pulse 69   Temp 98 8 °F (37 1 °C) (Oral)   Resp 18   Ht 5' 9 5" (1 765 m)   Wt 83 2 kg (183 lb 8 oz)   SpO2 99%   BMI 26 71 kg/m²          Physical Exam   Constitutional: He appears well-developed and well-nourished  No distress  HENT:   Head: Normocephalic and atraumatic  Right Ear: External ear normal    Left Ear: External ear normal    Mouth/Throat: Oropharynx is clear and moist    Eyes: Pupils are equal, round, and reactive to light  Conjunctivae are normal  Right eye exhibits no discharge  Left eye exhibits no discharge  No scleral icterus  Neck: Neck supple  Cardiovascular: Normal rate, regular rhythm and normal heart sounds  Exam reveals no gallop and no friction rub  No murmur heard  Pulmonary/Chest: No respiratory distress  He has no wheezes  He has no rales  Abdominal: Soft  Bowel sounds are normal  He exhibits no distension and no mass  There is no tenderness  There is no rebound and no guarding  Musculoskeletal: He exhibits no edema or deformity  Lymphadenopathy:     He has no cervical adenopathy  Neurological: He is alert  Skin: He is not diaphoretic  Psychiatric: He has a normal mood and affect

## 2019-12-10 NOTE — ASSESSMENT & PLAN NOTE
Assessment and plan 1  Health maintenance annual wellness examination overall the patient is clinically stable and doing well, we encouraged the patient to follow a healthy and balanced diet  We recommend that the patient exercise routinely approximately 30 minutes 5 times per week   We have reviewed the patient's vaccines and have made recommendations for updates if necessary  recommend annual flu shot, consider the new shingles vaccine      We will be ordering screening laboratories which are age appropriate  Return to the office in 6 months    call if any problems

## 2019-12-15 ENCOUNTER — TELEPHONE (OUTPATIENT)
Dept: OTHER | Facility: OTHER | Age: 54
End: 2019-12-15

## 2019-12-15 NOTE — TELEPHONE ENCOUNTER
Tiger Text Dr Neo Gudino @ [phone]    Actual text:  [phone]/ [address] [facility] labs/ PT  Mari Dumont 1965/ STAT     Informed caller to call service within 20 minutes if no response

## 2019-12-16 ENCOUNTER — TELEPHONE (OUTPATIENT)
Dept: INTERNAL MEDICINE CLINIC | Facility: CLINIC | Age: 54
End: 2019-12-16

## 2019-12-16 NOTE — TELEPHONE ENCOUNTER
Please have the patient repeat the potassium level tomorrow, please have the patient follow a low-potassium diet and avoid potassium supplements

## 2019-12-17 DIAGNOSIS — R89.9 ABNORMAL LABORATORY TEST: Primary | ICD-10-CM

## 2019-12-17 DIAGNOSIS — E87.5 SERUM POTASSIUM ELEVATED: Primary | ICD-10-CM

## 2019-12-17 NOTE — TELEPHONE ENCOUNTER
I spoke with the pt, he will have the potassium completed today  Order faxed to [place] [phone] fax [phone]

## 2020-03-03 ENCOUNTER — APPOINTMENT (EMERGENCY)
Dept: CT IMAGING | Facility: HOSPITAL | Age: 55
End: 2020-03-03
Payer: COMMERCIAL

## 2020-03-03 ENCOUNTER — HOSPITAL ENCOUNTER (EMERGENCY)
Facility: HOSPITAL | Age: 55
Discharge: HOME | End: 2020-03-04
Attending: EMERGENCY MEDICINE
Payer: COMMERCIAL

## 2020-03-03 VITALS
DIASTOLIC BLOOD PRESSURE: 84 MMHG | RESPIRATION RATE: 18 BRPM | HEART RATE: 71 BPM | TEMPERATURE: 98.1 F | BODY MASS INDEX: 27.89 KG/M2 | SYSTOLIC BLOOD PRESSURE: 130 MMHG | OXYGEN SATURATION: 100 % | WEIGHT: 191.58 LBS

## 2020-03-03 DIAGNOSIS — R55 SYNCOPE AND COLLAPSE: Primary | ICD-10-CM

## 2020-03-03 LAB
ALBUMIN SERPL BCP-MCNC: 2.7 G/DL (ref 3.5–5)
ALP SERPL-CCNC: 53 U/L (ref 46–116)
ALT SERPL W P-5'-P-CCNC: 23 U/L (ref 12–78)
ANION GAP SERPL CALCULATED.3IONS-SCNC: 9 MMOL/L (ref 4–13)
AST SERPL W P-5'-P-CCNC: 8 U/L (ref 5–45)
BASOPHILS # BLD AUTO: 0.03 THOUSANDS/ΜL (ref 0–0.1)
BASOPHILS NFR BLD AUTO: 1 % (ref 0–1)
BILIRUB SERPL-MCNC: 0.28 MG/DL (ref 0.2–1)
BUN SERPL-MCNC: 11 MG/DL (ref 5–25)
CALCIUM SERPL-MCNC: 6.7 MG/DL (ref 8.3–10.1)
CHLORIDE SERPL-SCNC: 113 MMOL/L (ref 100–108)
CO2 SERPL-SCNC: 25 MMOL/L (ref 21–32)
CREAT SERPL-MCNC: 0.75 MG/DL (ref 0.6–1.3)
EOSINOPHIL # BLD AUTO: 0.1 THOUSAND/ΜL (ref 0–0.61)
EOSINOPHIL NFR BLD AUTO: 2 % (ref 0–6)
ERYTHROCYTE [DISTWIDTH] IN BLOOD BY AUTOMATED COUNT: 13.1 % (ref 11.6–15.1)
GFR SERPL CREATININE-BSD FRML MDRD: 105 ML/MIN/1.73SQ M
GLUCOSE SERPL-MCNC: 69 MG/DL (ref 65–140)
HCT VFR BLD AUTO: 44.3 % (ref 36.5–49.3)
HGB BLD-MCNC: 15 G/DL (ref 12–17)
IMM GRANULOCYTES # BLD AUTO: 0.01 THOUSAND/UL (ref 0–0.2)
IMM GRANULOCYTES NFR BLD AUTO: 0 % (ref 0–2)
LYMPHOCYTES # BLD AUTO: 1.67 THOUSANDS/ΜL (ref 0.6–4.47)
LYMPHOCYTES NFR BLD AUTO: 35 % (ref 14–44)
MAGNESIUM SERPL-MCNC: 1.6 MG/DL (ref 1.6–2.6)
MCH RBC QN AUTO: 30.2 PG (ref 26.8–34.3)
MCHC RBC AUTO-ENTMCNC: 33.9 G/DL (ref 31.4–37.4)
MCV RBC AUTO: 89 FL (ref 82–98)
MONOCYTES # BLD AUTO: 0.63 THOUSAND/ΜL (ref 0.17–1.22)
MONOCYTES NFR BLD AUTO: 13 % (ref 4–12)
NEUTROPHILS # BLD AUTO: 2.35 THOUSANDS/ΜL (ref 1.85–7.62)
NEUTS SEG NFR BLD AUTO: 49 % (ref 43–75)
NRBC BLD AUTO-RTO: 0 /100 WBCS
PLATELET # BLD AUTO: 196 THOUSANDS/UL (ref 149–390)
PMV BLD AUTO: 9.7 FL (ref 8.9–12.7)
POTASSIUM SERPL-SCNC: 2.6 MMOL/L (ref 3.5–5.3)
PROT SERPL-MCNC: 5.1 G/DL (ref 6.4–8.2)
RBC # BLD AUTO: 4.97 MILLION/UL (ref 3.88–5.62)
SODIUM SERPL-SCNC: 147 MMOL/L (ref 136–145)
WBC # BLD AUTO: 4.79 THOUSAND/UL (ref 4.31–10.16)

## 2020-03-03 PROCEDURE — 99284 EMERGENCY DEPT VISIT MOD MDM: CPT | Performed by: EMERGENCY MEDICINE

## 2020-03-03 PROCEDURE — 96360 HYDRATION IV INFUSION INIT: CPT

## 2020-03-03 PROCEDURE — 80053 COMPREHEN METABOLIC PANEL: CPT | Performed by: EMERGENCY MEDICINE

## 2020-03-03 PROCEDURE — 93005 ELECTROCARDIOGRAM TRACING: CPT

## 2020-03-03 PROCEDURE — 83735 ASSAY OF MAGNESIUM: CPT | Performed by: EMERGENCY MEDICINE

## 2020-03-03 PROCEDURE — 85025 COMPLETE CBC W/AUTO DIFF WBC: CPT | Performed by: EMERGENCY MEDICINE

## 2020-03-03 PROCEDURE — 36415 COLL VENOUS BLD VENIPUNCTURE: CPT | Performed by: EMERGENCY MEDICINE

## 2020-03-03 PROCEDURE — 99285 EMERGENCY DEPT VISIT HI MDM: CPT

## 2020-03-03 RX ADMIN — SODIUM CHLORIDE 1000 ML: 0.9 INJECTION, SOLUTION INTRAVENOUS at 22:55

## 2020-03-04 ENCOUNTER — TELEPHONE (OUTPATIENT)
Dept: INTERNAL MEDICINE CLINIC | Facility: CLINIC | Age: 55
End: 2020-03-04

## 2020-03-04 ENCOUNTER — OFFICE VISIT (OUTPATIENT)
Dept: INTERNAL MEDICINE CLINIC | Facility: CLINIC | Age: 55
End: 2020-03-04
Payer: COMMERCIAL

## 2020-03-04 VITALS
OXYGEN SATURATION: 96 % | HEART RATE: 80 BPM | SYSTOLIC BLOOD PRESSURE: 118 MMHG | DIASTOLIC BLOOD PRESSURE: 78 MMHG | WEIGHT: 182.8 LBS | BODY MASS INDEX: 26.17 KG/M2 | RESPIRATION RATE: 16 BRPM | HEIGHT: 70 IN

## 2020-03-04 DIAGNOSIS — R55 SYNCOPE, UNSPECIFIED SYNCOPE TYPE: ICD-10-CM

## 2020-03-04 DIAGNOSIS — H57.04: ICD-10-CM

## 2020-03-04 DIAGNOSIS — E87.6 HYPOKALEMIA: ICD-10-CM

## 2020-03-04 DIAGNOSIS — R55 SYNCOPE, UNSPECIFIED SYNCOPE TYPE: Primary | ICD-10-CM

## 2020-03-04 DIAGNOSIS — H57.04: Primary | ICD-10-CM

## 2020-03-04 LAB
ALBUMIN SERPL BCP-MCNC: 3.3 G/DL (ref 3.5–5)
ALP SERPL-CCNC: 73 U/L (ref 46–116)
ALT SERPL W P-5'-P-CCNC: 26 U/L (ref 12–78)
ANION GAP SERPL CALCULATED.3IONS-SCNC: 10 MMOL/L (ref 4–13)
AST SERPL W P-5'-P-CCNC: 16 U/L (ref 5–45)
ATRIAL RATE: 66 BPM
BILIRUB SERPL-MCNC: 0.36 MG/DL (ref 0.2–1)
BUN SERPL-MCNC: 13 MG/DL (ref 5–25)
CALCIUM SERPL-MCNC: 8.4 MG/DL (ref 8.3–10.1)
CHLORIDE SERPL-SCNC: 107 MMOL/L (ref 100–108)
CO2 SERPL-SCNC: 26 MMOL/L (ref 21–32)
CREAT SERPL-MCNC: 0.95 MG/DL (ref 0.6–1.3)
GFR SERPL CREATININE-BSD FRML MDRD: 91 ML/MIN/1.73SQ M
GLUCOSE SERPL-MCNC: 107 MG/DL (ref 65–140)
P AXIS: 56 DEGREES
POTASSIUM SERPL-SCNC: 3.3 MMOL/L (ref 3.5–5.3)
PR INTERVAL: 162 MS
PROT SERPL-MCNC: 6.4 G/DL (ref 6.4–8.2)
QRS AXIS: 57 DEGREES
QRSD INTERVAL: 112 MS
QT INTERVAL: 388 MS
QTC INTERVAL: 406 MS
SODIUM SERPL-SCNC: 143 MMOL/L (ref 136–145)
T WAVE AXIS: 32 DEGREES
VENTRICULAR RATE: 66 BPM

## 2020-03-04 PROCEDURE — 93010 ELECTROCARDIOGRAM REPORT: CPT | Performed by: INTERNAL MEDICINE

## 2020-03-04 PROCEDURE — 1036F TOBACCO NON-USER: CPT | Performed by: INTERNAL MEDICINE

## 2020-03-04 PROCEDURE — 99214 OFFICE O/P EST MOD 30 MIN: CPT | Performed by: INTERNAL MEDICINE

## 2020-03-04 RX ORDER — POTASSIUM CHLORIDE 20 MEQ/1
40 TABLET, EXTENDED RELEASE ORAL ONCE
Status: COMPLETED | OUTPATIENT
Start: 2020-03-04 | End: 2020-03-04

## 2020-03-04 RX ORDER — POTASSIUM CHLORIDE 20 MEQ/1
20 TABLET, EXTENDED RELEASE ORAL 2 TIMES DAILY
Qty: 10 TABLET | Refills: 0 | Status: SHIPPED | OUTPATIENT
Start: 2020-03-04 | End: 2020-03-18 | Stop reason: CLARIF

## 2020-03-04 RX ADMIN — POTASSIUM CHLORIDE 40 MEQ: 1500 TABLET, EXTENDED RELEASE ORAL at 00:16

## 2020-03-04 NOTE — ED PROVIDER NOTES
History  Chief Complaint   Patient presents with    Syncope     pt was at home and got up from recliner to go to BR, became dizzy and "pale," pt denies passing out/fall  alert and oriented x4  no complaints at this time  pt states he had 3 beers at dinner     This is a 49 y o  old male who presents to the ED for evaluation of syncope  Patient states he was asleep on the couch stood up to go to the bathroom finished using the bathroom and was stumbling on his way back to the couch and he collapsed into and seat and was pale, diaphoretic and had a loss of consciousness  He was staring at the ground not responding to his wife  He did have some focal twitching of his left arm however he normally has twitching of his left arm  This was about 30 seconds to a minute and then he woke up and was back to normal   There was no postictal period  No tongue biting  No chest pain or shortness of breath  This has never happened before  No history of seizures  No cardiac history  Does admit to consuming alcohol with dinner, specifically had 3 beers  He currently is asymptomatic and back to baseline  Otherwise, patient denies fevers, chills, night sweats, cough, congestion, rhinorrhea, CP, dyspnea, abdominal pain, nausea, vomiting, diarrhea, constipation, urinary symptoms, leg pain or swelling  Prior to Admission Medications   Prescriptions Last Dose Informant Patient Reported? Taking?    Cholecalciferol (VITAMIN D) 2000 units tablet  Self Yes No   Sig: Take 2,000 Units by mouth daily      Facility-Administered Medications: None     Past Medical History:   Diagnosis Date    Hyperlipidemia     Peptic ulcer     LAST ASSESSED: 9/11/14    Vitamin D deficiency     LAST ASSESSED: 9/17/15     Past Surgical History:   Procedure Laterality Date    BACK SURGERY      LIPECTOMY      L5; LAST ASSESSED: 9/11/14    OR COLONOSCOPY FLX DX W/COLLJ SPEC WHEN PFRMD N/A 11/14/2016    Procedure: COLONOSCOPY;  Surgeon: Christina Goodwin MD;  Location: [facility] GI LAB; Service: Gastroenterology     Family History   Problem Relation Age of Onset    Stroke Father         CEREBROVASCULAR ACCIDENT     I have reviewed and agree with the history as documented  E-Cigarette/Vaping    E-Cigarette Use Never User      E-Cigarette/Vaping Substances     Social History     Tobacco Use    Smoking status: Never Smoker    Smokeless tobacco: Never Used   Substance Use Topics    Alcohol use: Yes     Frequency: 2-3 times a week     Drinks per session: 3 or 4     Comment: occasional     Drug use: No     Review of Systems   Constitutional: Negative for chills, fatigue, fever and unexpected weight change  HENT: Negative for congestion, rhinorrhea and sore throat  Eyes: Negative for redness and visual disturbance  Respiratory: Negative for cough and shortness of breath  Cardiovascular: Negative for chest pain and leg swelling  Gastrointestinal: Negative for abdominal pain, constipation, diarrhea, nausea and vomiting  Endocrine: Negative for cold intolerance and heat intolerance  Genitourinary: Negative for dysuria, frequency and urgency  Musculoskeletal: Negative for back pain  Skin: Negative for rash  Neurological: Positive for syncope  Negative for dizziness and numbness  All other systems reviewed and are negative  Physical Exam  Physical Exam   Constitutional: He is oriented to person, place, and time  He appears well-developed and well-nourished  No distress  HENT:   Head: Normocephalic and atraumatic  Nose: Nose normal    Eyes: Pupils are equal, round, and reactive to light  Conjunctivae and EOM are normal    Neck: Normal range of motion  Neck supple  Cardiovascular: Normal rate, regular rhythm and normal heart sounds  Exam reveals no gallop  No murmur heard  Pulmonary/Chest: Effort normal and breath sounds normal  No respiratory distress  He has no wheezes  He has no rales  Abdominal: Soft   Bowel sounds are normal  He exhibits no distension and no mass  There is no tenderness  There is no rebound and no guarding  Musculoskeletal: Normal range of motion  He exhibits no edema or deformity  Lymphadenopathy:     He has no cervical adenopathy  Neurological: He is alert and oriented to person, place, and time  No cranial nerve deficit  Skin: Skin is warm and dry  No rash noted  He is not diaphoretic  No erythema  Psychiatric: He has a normal mood and affect  Nursing note and vitals reviewed      Vital Signs  ED Triage Vitals [03/03/20 2239]   Temperature Pulse Respirations Blood Pressure SpO2   98 1 °F (36 7 °C) 71 18 130/84 100 %      Temp Source Heart Rate Source Patient Position - Orthostatic VS BP Location FiO2 (%)   Oral Monitor Sitting Right arm --      Pain Score       No Pain         ED Medications  Medications   sodium chloride 0 9 % bolus 1,000 mL (0 mL Intravenous Stopped 3/3/20 2334)   potassium chloride (K-DUR,KLOR-CON) CR tablet 40 mEq (40 mEq Oral Given 3/4/20 0016)     Diagnostic Studies  Results Reviewed     Procedure Component Value Units Date/Time    Comprehensive metabolic panel [[id number]]  (Abnormal) Collected:  03/03/20 2334    Lab Status:  Final result Specimen:  Blood from Arm, Left Updated:  03/04/20 0004     Sodium 143 mmol/L      Potassium 3 3 mmol/L      Chloride 107 mmol/L      CO2 26 mmol/L      ANION GAP 10 mmol/L      BUN 13 mg/dL      Creatinine 0 95 mg/dL      Glucose 107 mg/dL      Calcium 8 4 mg/dL      AST 16 U/L      ALT 26 U/L      Alkaline Phosphatase 73 U/L      Total Protein 6 4 g/dL      Albumin 3 3 g/dL      Total Bilirubin 0 36 mg/dL      eGFR 91 ml/min/1 73sq m     Narrative:       [place] guidelines for Chronic Kidney Disease (CKD):     Stage 1 with normal or high GFR (GFR > 90 mL/min/1 73 square meters)    Stage 2 Mild CKD (GFR = 60-89 mL/min/1 73 square meters)    Stage 3A Moderate CKD (GFR = 45-59 mL/min/1 73 square meters)    Stage 3B Moderate CKD (GFR = 30-44 mL/min/1 73 square meters)    Stage 4 Severe CKD (GFR = 15-29 mL/min/1 73 square meters)    Stage 5 End Stage CKD (GFR <15 mL/min/1 73 square meters)  Note: GFR calculation is accurate only with a steady state creatinine    Comprehensive metabolic panel [[id number]]  (Abnormal) Collected:  03/03/20 2254    Lab Status:  Final result Specimen:  Blood from Arm, Left Updated:  03/03/20 2320     Sodium 147 mmol/L      Potassium 2 6 mmol/L      Chloride 113 mmol/L      CO2 25 mmol/L      ANION GAP 9 mmol/L      BUN 11 mg/dL      Creatinine 0 75 mg/dL      Glucose 69 mg/dL      Calcium 6 7 mg/dL      AST 8 U/L      ALT 23 U/L      Alkaline Phosphatase 53 U/L      Total Protein 5 1 g/dL      Albumin 2 7 g/dL      Total Bilirubin 0 28 mg/dL      eGFR 105 ml/min/1 73sq m     Narrative:       [place] guidelines for Chronic Kidney Disease (CKD):     Stage 1 with normal or high GFR (GFR > 90 mL/min/1 73 square meters)    Stage 2 Mild CKD (GFR = 60-89 mL/min/1 73 square meters)    Stage 3A Moderate CKD (GFR = 45-59 mL/min/1 73 square meters)    Stage 3B Moderate CKD (GFR = 30-44 mL/min/1 73 square meters)    Stage 4 Severe CKD (GFR = 15-29 mL/min/1 73 square meters)    Stage 5 End Stage CKD (GFR <15 mL/min/1 73 square meters)  Note: GFR calculation is accurate only with a steady state creatinine    Magnesium [[id number]]  (Normal) Collected:  03/03/20 2254    Lab Status:  Final result Specimen:  Blood from Arm, Left Updated:  03/03/20 2317     Magnesium 1 6 mg/dL     CBC and differential [[id number]]  (Abnormal) Collected:  03/03/20 2254    Lab Status:  Final result Specimen:  Blood from Arm, Left Updated:  03/03/20 2302     WBC 4 79 Thousand/uL      RBC 4 97 Million/uL      Hemoglobin 15 0 g/dL      Hematocrit 44 3 %      MCV 89 fL      MCH 30 2 pg      MCHC 33 9 g/dL      RDW 13 1 %      MPV 9 7 fL      Platelets 120 Thousands/uL      nRBC 0 /100 WBCs      Neutrophils Relative 49 %      Immat GRANS % 0 %      Lymphocytes Relative 35 %      Monocytes Relative 13 %      Eosinophils Relative 2 %      Basophils Relative 1 %      Neutrophils Absolute 2 35 Thousands/µL      Immature Grans Absolute 0 01 Thousand/uL      Lymphocytes Absolute 1 67 Thousands/µL      Monocytes Absolute 0 63 Thousand/µL      Eosinophils Absolute 0 10 Thousand/µL      Basophils Absolute 0 03 Thousands/µL         No orders to display     Procedures  ECG 12 Lead Documentation Only  Date/Time: 3/3/2020 10:43 PM  Performed by: Jess Souza MD  Authorized by: Jess Souza MD     Indications / Diagnosis:  Syncope  ECG reviewed by me, the ED Provider: yes    Patient location:  ED  Previous ECG:     Previous ECG:  Unavailable  Comments:      Normal sinus rhythm, rate 66, normal intervals, no evidence of pre-excitation, no acute ST, T-wave changes  Flattened T-wave in lead III otherwise normal T-waves  No previous EKGs available  ED Course       A/P: This is a 49 y o  male who presents to the ED for evaluation of syncope  Unlikely seizure in the absence of postictal period  Given the twitching of the left arm, CTH  Cardiac labs, EKG  Reevaluate and dispo accordingly  8863 Patient adamantly refusing CT due to the cost - "the insurance is gonna charge us an arm and a leg " Discussed with patient and wife cannot rule out life threatening diagnoses due to him refusing the work up  He is ok with that and his wife is in agreement to refuse the [facility] against my advice  0020 Repeat CMP normal - first contaminated with IVF  Will replete K, follow up with PCP in a few days for reevaluation and further work up as indicated  I personally discussed return precautions with this patient and family   I provided the patient with written discharge instructions and particularly highlighted specific areas of interest to this patient, including but not limited to: medications for symptom managment, follow up recommendations, and return precautions  Patient and family are in agreement with this plan as outlined above  MDM    Disposition  Final diagnoses:   Syncope and collapse     Time reflects when diagnosis was documented in both MDM as applicable and the Disposition within this note     Time User Action Codes Description Comment    3/4/2020 12:12 AM Cameron Beaver Add [R55] Syncope and collapse       ED Disposition     ED Disposition Condition Date/Time Comment    Discharge Stable [place] Mar 3, 2020 10:58 PM Martín Rich discharge to home/self care  Follow-up Information     Follow up With Specialties Details Why [address]  [facility], DO Internal Medicine Call in 1 day For follow up of passing out [id number]  [phone]          Discharge Medication List as of 3/4/2020 12:13 AM      START taking these medications    Details   potassium chloride (K-DUR,KLOR-CON) 20 mEq tablet Take 1 tablet (20 mEq total) by mouth 2 (two) times a day for 5 days, Starting Wed 3/4/2020, Until Mon 3/9/2020, Print         CONTINUE these medications which have NOT CHANGED    Details   Cholecalciferol (VITAMIN D) 2000 units tablet Take 2,000 Units by mouth daily, Historical Med           No discharge procedures on file      PDMP Review     None          ED Provider  Electronically Signed by           Alis Gandhi MD  03/04/20 0077

## 2020-03-04 NOTE — ED NOTES
CT tech went to take patient for CT head, patient and wife refusing stating "we don't need that " Dr Gabo Perez made aware       Harper Aragon, RN  03/03/20 2098

## 2020-03-04 NOTE — TELEPHONE ENCOUNTER
Pt calling in stating Central Scheduling told him the EEG order needs to be updated to a "normal" EEG since he did not have one done in the last 12 months he cannot have the one ordered    Please enter updated one / advise pt once corrected

## 2020-03-04 NOTE — PROGRESS NOTES
Assessment/Plan:    Hypokalemia  Start potassium chloride 20 mEq once daily check a potassium level in 4 days check on aldosterone level, check aldosterone/renin level    Syncope  Patient did have recent episode of syncope requiring ER evaluation he did have a staring spell which raises the question if he could have had a seizure  On examination he does have a nonreactive pupil right side will check MRI of the brain, I will have the patient see Ophthalmology will check EEG will check Holter monitor, echocardiogram and have the patient see Cardiology  He is not to drive and we notify Alvaro hernandez at this point time  If any recurrent symptoms go immediately to the hospital he is to drink adequate amounts of water 1 glass of Gatorade G2 daily and change positions slowly  Fixed dilated pupil  Rule out brain tumor will check MRI of the brain         Problem List Items Addressed This Visit        Cardiovascular and Mediastinum    Syncope - Primary     Patient did have recent episode of syncope requiring ER evaluation he did have a staring spell which raises the question if he could have had a seizure  On examination he does have a nonreactive pupil right side will check MRI of the brain, I will have the patient see Ophthalmology will check EEG will check Holter monitor, echocardiogram and have the patient see Cardiology  He is not to drive and we notify Alvaro hernandez at this point time  If any recurrent symptoms go immediately to the hospital he is to drink adequate amounts of water 1 glass of Gatorade G2 daily and change positions slowly           Relevant Orders    MRI brain wo contrast    Ambulatory referral to Ophthalmology    Ambulatory EEG 48 Hours    Holter monitor - 48 hour    Echo complete with contrast if indicated    Aldosterone/Renin Ratio    Aldosterone    Basic metabolic panel    Cortisol Level, AM Specimen    Ambulatory referral to Cardiology       Other    Fixed dilated pupil     Rule out brain tumor will check MRI of the brain         Relevant Orders    MRI brain wo contrast    Ambulatory referral to Ophthalmology    Ambulatory EEG 48 Hours    Hypokalemia     Start potassium chloride 20 mEq once daily check a potassium level in 4 days check on aldosterone level, check aldosterone/renin level               Return to office  1 months  call if any problems  Subjective:      Patient ID: Serina Moulton is a 49 y o  male  HPI 25-year-old male coming in regarding syncopal episode, fixed dilated pupil, hypokalemia; The patient reports me compliant taking medications without untoward side effects the  The patient is here to review his medical condition, update me on the medical condition and the patient reports me no hospitalizations and 1 ER visits  2-3 beer per day , didn't drink water day of passing out, 20 oz coffee, no seizure , passed out  9:45am passed out   1 monute syncope , no cp acting appropiate nevere in the past ,  Never happened pale initially  Turned head and stared wife check on him  He was sitting there staring with oh sob pt states he was aware of wife being there    The following portions of the patient's history were reviewed and updated as appropriate: allergies, current medications, past family history, past medical history, past social history, past surgical history and problem list     Review of Systems   Constitutional: Negative for activity change, appetite change and unexpected weight change  HENT: Negative for congestion and postnasal drip  Eyes: Negative for visual disturbance  Respiratory: Negative for cough and shortness of breath  Cardiovascular: Negative for chest pain  Gastrointestinal: Negative for abdominal pain, diarrhea, nausea and vomiting  Neurological: Positive for syncope  Negative for dizziness, light-headedness and headaches  Episode of staring   Hematological: Negative for adenopathy  Objective:    No follow-ups on file  No results found        No Known Allergies    Past Medical History:   Diagnosis Date    Hyperlipidemia     Peptic ulcer     LAST ASSESSED: 9/11/14    Vitamin D deficiency     LAST ASSESSED: 9/17/15     Past Surgical History:   Procedure Laterality Date    BACK SURGERY      LIPECTOMY      L5; LAST ASSESSED: 9/11/14    IN COLONOSCOPY FLX DX W/COLLJ SPEC WHEN PFRMD N/A 11/14/2016    Procedure: COLONOSCOPY;  Surgeon: Zain Kowalski MD;  Location: [facility] GI LAB; Service: Gastroenterology     Current Outpatient Medications on File Prior to Visit   Medication Sig Dispense Refill    Cholecalciferol (VITAMIN D) 2000 units tablet Take 2,000 Units by mouth daily      potassium chloride (K-DUR,KLOR-CON) 20 mEq tablet Take 1 tablet (20 mEq total) by mouth 2 (two) times a day for 5 days 10 tablet 0     No current facility-administered medications on file prior to visit        Family History   Problem Relation Age of Onset    Stroke Father         CEREBROVASCULAR ACCIDENT     Social History     Socioeconomic History    Marital status: /Civil Union     Spouse name: Not on file    Number of children: Not on file    Years of education: Not on file    Highest education level: Not on file   Occupational History    Not on file   Social Needs    Financial resource strain: Not on file    Food insecurity:     Worry: Not on file     Inability: Not on file    Transportation needs:     Medical: Not on file     Non-medical: Not on file   Tobacco Use    Smoking status: Never Smoker    Smokeless tobacco: Never Used   Substance and Sexual Activity    Alcohol use: Yes     Frequency: 2-3 times a week     Drinks per session: 3 or 4     Comment: occasional     Drug use: No    Sexual activity: Not on file   Lifestyle    Physical activity:     Days per week: Not on file     Minutes per session: Not on file    Stress: Not on file   Relationships    Social connections:     Talks on phone: Not on file     Gets together: Not on file     Attends Yazidism service: Not on file     Active member of club or organization: Not on file     Attends meetings of clubs or organizations: Not on file     Relationship status: Not on file    Intimate partner violence:     Fear of current or ex partner: Not on file     Emotionally abused: Not on file     Physically abused: Not on file     Forced sexual activity: Not on file   Other Topics Concern    Not on file   Social History Narrative    Not on file     Vitals:    03/04/20 1325   BP: 118/78   Pulse: 80   Resp: 16   SpO2: 96%   Weight: 82 9 kg (182 lb 12 8 oz)   Height: 5' 9 5" (1 765 m)     Results for orders placed or performed during the hospital encounter of 03/03/20   CBC and differential   Result Value Ref Range    WBC 4 79 4 31 - 10 16 Thousand/uL    RBC 4 97 3 88 - 5 62 Million/uL    Hemoglobin 15 0 12 0 - 17 0 g/dL    Hematocrit 44 3 36 5 - 49 3 %    MCV 89 82 - 98 fL    MCH 30 2 26 8 - 34 3 pg    MCHC 33 9 31 4 - 37 4 g/dL    RDW 13 1 11 6 - 15 1 %    MPV 9 7 8 9 - 12 7 fL    Platelets 656 141 - 012 Thousands/uL    nRBC 0 /100 WBCs    Neutrophils Relative 49 43 - 75 %    Immat GRANS % 0 0 - 2 %    Lymphocytes Relative 35 14 - 44 %    Monocytes Relative 13 (H) 4 - 12 %    Eosinophils Relative 2 0 - 6 %    Basophils Relative 1 0 - 1 %    Neutrophils Absolute 2 35 1 85 - 7 62 Thousands/µL    Immature Grans Absolute 0 01 0 00 - 0 20 Thousand/uL    Lymphocytes Absolute 1 67 0 60 - 4 47 Thousands/µL    Monocytes Absolute 0 63 0 17 - 1 22 Thousand/µL    Eosinophils Absolute 0 10 0 00 - 0 61 Thousand/µL    Basophils Absolute 0 03 0 00 - 0 10 Thousands/µL   Comprehensive metabolic panel   Result Value Ref Range    Sodium 147 (H) 136 - 145 mmol/L    Potassium 2 6 (LL) 3 5 - 5 3 mmol/L    Chloride 113 (H) 100 - 108 mmol/L    CO2 25 21 - 32 mmol/L    ANION GAP 9 4 - 13 mmol/L    BUN 11 5 - 25 mg/dL    Creatinine 0 75 0 60 - 1 30 mg/dL    Glucose 69 65 - 140 mg/dL    Calcium 6 7 (L) 8 3 - 10 1 mg/dL    AST 8 5 - 45 U/L ALT 23 12 - 78 U/L    Alkaline Phosphatase 53 46 - 116 U/L    Total Protein 5 1 (L) 6 4 - 8 2 g/dL    Albumin 2 7 (L) 3 5 - 5 0 g/dL    Total Bilirubin 0 28 0 20 - 1 00 mg/dL    eGFR 105 ml/min/1 73sq m   Magnesium   Result Value Ref Range    Magnesium 1 6 1 6 - 2 6 mg/dL   Comprehensive metabolic panel   Result Value Ref Range    Sodium 143 136 - 145 mmol/L    Potassium 3 3 (L) 3 5 - 5 3 mmol/L    Chloride 107 100 - 108 mmol/L    CO2 26 21 - 32 mmol/L    ANION GAP 10 4 - 13 mmol/L    BUN 13 5 - 25 mg/dL    Creatinine 0 95 0 60 - 1 30 mg/dL    Glucose 107 65 - 140 mg/dL    Calcium 8 4 8 3 - 10 1 mg/dL    AST 16 5 - 45 U/L    ALT 26 12 - 78 U/L    Alkaline Phosphatase 73 46 - 116 U/L    Total Protein 6 4 6 4 - 8 2 g/dL    Albumin 3 3 (L) 3 5 - 5 0 g/dL    Total Bilirubin 0 36 0 20 - 1 00 mg/dL    eGFR 91 ml/min/1 73sq m   ECG 12 lead   Result Value Ref Range    Ventricular Rate 66 BPM    Atrial Rate 66 BPM    WA Interval 162 ms    QRSD Interval 112 ms    QT Interval 388 ms    QTC Interval 406 ms    P Axis 56 degrees    QRS Axis 57 degrees    T Wave Axis 32 degrees     Weight (last 2 days)     Date/Time   Weight    03/04/20 1325   82 9 (182 8)            Body mass index is 26 61 kg/m²  BP      Temp      Pulse     Resp      SpO2        Vitals:    03/04/20 1325   Weight: 82 9 kg (182 lb 12 8 oz)     Vitals:    03/04/20 1325   Weight: 82 9 kg (182 lb 12 8 oz)       /78   Pulse 80   Resp 16   Ht 5' 9 5" (1 765 m)   Wt 82 9 kg (182 lb 12 8 oz)   SpO2 96%   BMI 26 61 kg/m²          Physical Exam   Constitutional: He appears well-developed and well-nourished  No distress  HENT:   Head: Normocephalic and atraumatic  Right Ear: External ear normal    Left Ear: External ear normal    Mouth/Throat: Oropharynx is clear and moist    Eyes: Pupils are equal, round, and reactive to light  Conjunctivae are normal  Right eye exhibits no discharge  Left eye exhibits no discharge  No scleral icterus     Neck: Neck supple  Cardiovascular: Normal rate, regular rhythm and normal heart sounds  Exam reveals no gallop and no friction rub  No murmur heard  Pulmonary/Chest: No respiratory distress  He has no wheezes  He has no rales  Abdominal: Soft  Bowel sounds are normal  He exhibits no distension and no mass  There is no tenderness  There is no rebound and no guarding  Musculoskeletal: He exhibits no edema or deformity  Lymphadenopathy:     He has no cervical adenopathy  Neurological: He is alert  Skin: He is not diaphoretic  Psychiatric: He has a normal mood and affect  fixed pupil right side not reactive to light left pupil reacts to light, EOMI, cranial nerves 2-12 grossly intact motor strength 5/5 bilateral, DTRs 2/4, finger-to-nose testing intact some mild abnormality of heel to toe on a straight line    No pronator drift

## 2020-03-05 PROBLEM — R55 SYNCOPE: Status: ACTIVE | Noted: 2020-03-05

## 2020-03-05 PROBLEM — H57.04: Status: ACTIVE | Noted: 2020-03-05

## 2020-03-05 PROBLEM — E87.6 HYPOKALEMIA: Status: ACTIVE | Noted: 2020-03-05

## 2020-03-05 NOTE — TELEPHONE ENCOUNTER
I reset the order please notify the patient if there is a problem let me know will need to discuss that with central scheduling then

## 2020-03-06 NOTE — ASSESSMENT & PLAN NOTE
Patient did have recent episode of syncope requiring ER evaluation he did have a staring spell which raises the question if he could have had a seizure  On examination he does have a nonreactive pupil right side will check MRI of the brain, I will have the patient see Ophthalmology will check EEG will check Holter monitor, echocardiogram and have the patient see Cardiology  He is not to drive and we notify Alvaro hernandez at this point time  If any recurrent symptoms go immediately to the hospital he is to drink adequate amounts of water 1 glass of Gatorade G2 daily and change positions slowly

## 2020-03-06 NOTE — ASSESSMENT & PLAN NOTE
Start potassium chloride 20 mEq once daily check a potassium level in 4 days check on aldosterone level, check aldosterone/renin level

## 2020-03-10 NOTE — TELEPHONE ENCOUNTER
EXAM DESCRIPTION:  CT TEMPORAL BONES WO



COMPLETED DATE/TIME:  3/9/2020 2:19 pm



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
EOPLASM OF BONE AND ARTICULAR CARTILAGE, UNSPE



COMPARISON:  None.



TECHNIQUE:  Noncontrasted thin section axial images through the temporal bones and skull base were ob
tained and reviewed at bone windows and bone algorithm with coronal and sagittal reconstructions.

All CT scanners at this facility use dose modulation, iterative reconstruction, and/or weight based d
osing when appropriate to reduce radiation dose to as low as reasonably achievable (ALARA).

[facility]: Dose Right  [facility]: CareDose    [facility]: Dose Right    [facility]: Teradose 4D    [facility]: Smart Technologies



RADIATION DOSE:  13 mGy.



LIMITATIONS:  None.



FINDINGS:  On axial image 95/133, a 13 mm benign bony exostosis is present along the left posterior a
uricular region off the mastoid.  This is best shown on axial image 95.

RIGHT SIDE:

EXTERNAL AUDITORY CANAL: Widely patent.

TYMPANIC MEMBRANE: No masses, thickening or medial retraction.

OSSICLES AND MIDDLE EAR CAVITY: Normal ossicles.  No middle ear masses or fluid.

INNER EAR STRUCTURES: Normal vestibule and cochlea.  Normal aqueducts.

INTERNAL AUDITORY CANAL: Normal bony canal without narrowing or widening.  No calcified or ossified m
asses.

TEMPOROMANDIBULAR JOINT: Normal.

MASTOID AIR CELLS: Clear.

LEFT SIDE:

EXTERNAL AUDITORY CANAL: Widely patent.

TYMPANIC MEMBRANE: No masses, thickening or medial retraction.

OSSICLES AND MIDDLE EAR CAVITY: Normal ossicles.  No middle ear masses or fluid.

INNER EAR STRUCTURES: Normal vestibule and cochlea.  Normal aqueducts.

INTERNAL AUDITORY CANAL: Normal bony canal without narrowing or widening.  No calcified or ossified m
asses.

TEMPOROMANDIBULAR JOINT: Normal.

MASTOID AIR CELLS: Clear.

CENTRAL SKULL BASE: Incidental finding of a calcified 15 mm meningioma along the left squamous tempor
al bone axial image 42.

INFERIOR BRAIN: Not well seen due to bone technique

LIMITED VIEW OF PARANASAL SINUSES IN THE FIELD OF VIEW: Minimal mucous membrane thickening in the lef
t sphenoid sinus.



IMPRESSION:  Palpable abnormality left posterior auricular region correlates with a benign densely os
sified exostosis



TECHNICAL DOCUMENTATION:  JOB ID:  [id number]

Quality ID # 436: Final reports with documentation of one or more dose reduction techniques (e.g., Au
tomated exposure control, adjustment of the mA and/or kV according to patient size, use of iterative 
reconstruction technique)

 2011 [employer]- All Rights Reserved



Reading location - IP/workstation name: [email or web address] Patient said he got a call yesterday regarding his lab results  He completed them on 12/14/19  They told him his kidney level was normal but the potassium was high  Also because his kidney level was normal there might have been an issue when it was drawn to cause the potassium level to be high maybe they busted a vein  Patient is asking if he should be rechecking or what should he do at this point?     Please advise

## 2020-03-11 ENCOUNTER — HOSPITAL ENCOUNTER (OUTPATIENT)
Dept: NEUROLOGY | Facility: HOSPITAL | Age: 55
Discharge: HOME | End: 2020-03-11
Payer: COMMERCIAL

## 2020-03-11 DIAGNOSIS — R55 SYNCOPE, UNSPECIFIED SYNCOPE TYPE: ICD-10-CM

## 2020-03-11 DIAGNOSIS — H57.04: ICD-10-CM

## 2020-03-11 PROCEDURE — 95819 EEG AWAKE AND ASLEEP: CPT

## 2020-03-12 ENCOUNTER — TELEPHONE (OUTPATIENT)
Dept: INTERNAL MEDICINE CLINIC | Facility: CLINIC | Age: 55
End: 2020-03-12

## 2020-03-12 DIAGNOSIS — R89.9 ABNORMAL LABORATORY TEST: Primary | ICD-10-CM

## 2020-03-13 ENCOUNTER — HOSPITAL ENCOUNTER (OUTPATIENT)
Dept: RADIOLOGY | Age: 55
Discharge: HOME | End: 2020-03-13
Payer: COMMERCIAL

## 2020-03-13 ENCOUNTER — TELEPHONE (OUTPATIENT)
Dept: INTERNAL MEDICINE CLINIC | Facility: CLINIC | Age: 55
End: 2020-03-13

## 2020-03-13 DIAGNOSIS — H57.04: ICD-10-CM

## 2020-03-13 DIAGNOSIS — R55 SYNCOPE, UNSPECIFIED SYNCOPE TYPE: ICD-10-CM

## 2020-03-13 PROCEDURE — 70551 MRI BRAIN STEM W/O DYE: CPT

## 2020-03-13 PROCEDURE — 95819 EEG AWAKE AND ASLEEP: CPT | Performed by: PSYCHIATRY & NEUROLOGY

## 2020-03-16 ENCOUNTER — TELEPHONE (OUTPATIENT)
Dept: INTERNAL MEDICINE CLINIC | Facility: CLINIC | Age: 55
End: 2020-03-16

## 2020-03-16 NOTE — TELEPHONE ENCOUNTER
Patient called in said he got his results to the EEG and it was normal  He also got a call to set up an appointment for (Ambulatory EEG 48 Hours)   Patient asked if this is necessary since his EEG came back normal?    Please advise

## 2020-03-17 DIAGNOSIS — H57.04: Primary | ICD-10-CM

## 2020-03-17 DIAGNOSIS — R55 SYNCOPE, UNSPECIFIED SYNCOPE TYPE: ICD-10-CM

## 2020-03-17 NOTE — TELEPHONE ENCOUNTER
Spoke with the patient he will see neurology order is in the chart and follow up with me after he completes all the testing

## 2020-03-18 ENCOUNTER — CONSULT (OUTPATIENT)
Dept: CARDIOLOGY CLINIC | Facility: CLINIC | Age: 55
End: 2020-03-18
Payer: COMMERCIAL

## 2020-03-18 VITALS
WEIGHT: 180.9 LBS | BODY MASS INDEX: 25.9 KG/M2 | DIASTOLIC BLOOD PRESSURE: 70 MMHG | OXYGEN SATURATION: 99 % | HEART RATE: 72 BPM | HEIGHT: 70 IN | SYSTOLIC BLOOD PRESSURE: 106 MMHG

## 2020-03-18 DIAGNOSIS — R55 SYNCOPE, UNSPECIFIED SYNCOPE TYPE: ICD-10-CM

## 2020-03-18 PROCEDURE — 99203 OFFICE O/P NEW LOW 30 MIN: CPT | Performed by: INTERNAL MEDICINE

## 2020-03-18 NOTE — PROGRESS NOTES
Cardiology Consultation      Serina Moulton  1965  [id number]  [facility] [id number]  [phone]    1  Syncope, unspecified syncope type  Ambulatory referral to Cardiology    Echo complete with contrast if indicated    Holter monitor - 24 hour    Stress test only, exercise          Discussion/Summary    1  Episode of lapse consciousness, dehydration with probable element of vagal response in setting of nothing eat all day (with exception he ate dinner and had 3  beers)    Plan:  Told patient to avoid dietary habits as he did day of event  He should eat and drink water regularly do avoid same situation  He Didn't eat all day, but ate dinner and had 3 beers  Event in setting of probable dehydration (potassium low) and vagal mediated  Instructed patient increased risk of dehydration and volume depletion with alcohol and no other fluid intake as he did that day  Highly doubt arrhythmia or cardiac cause although holter, echo and stress test ordered  Will follow up with pt regarding tests which I anticipate to be normal      History:     49year old here for consultation regarding lapse of consciousness, ? Syncope  Patient did not eat or drink anything all day  Went out to dinner, ate and had 3 beers  Ultimately went home, was on couch and got up to go to the bathroom, Came back from bathroom that evening at home and per wife "fell/collapsed" on the couch  No seizure activity, eyes were opened and had few seconds where it was perceived as if "he wasn't there"    Went to ED and ultimately discharged, potassium 3 3, no ETOH level  No prior episodes  No family hx sudden death  No childhood murmur or rheumatic fever  No further episodes  He was not on any medications or supplements at that time               Patient Active Problem List   Diagnosis    Wellness examination    Elevated PSA    Acute non-recurrent maxillary sinusitis    Seasonal allergic rhinitis    Syncope    Fixed dilated pupil    Hypokalemia     Past Medical History:   Diagnosis Date    Hyperlipidemia     Peptic ulcer     LAST ASSESSED: 9/11/14    Vitamin D deficiency     LAST ASSESSED: 9/17/15     Social History     Socioeconomic History    Marital status: /Civil Union     Spouse name: Not on file    Number of children: Not on file    Years of education: Not on file    Highest education level: Not on file   Occupational History    Not on file   Social Needs    Financial resource strain: Not on file    Food insecurity:     Worry: Not on file     Inability: Not on file    Transportation needs:     Medical: Not on file     Non-medical: Not on file   Tobacco Use    Smoking status: Never Smoker    Smokeless tobacco: Never Used   Substance and Sexual Activity    Alcohol use: Yes     Frequency: 2-3 times a week     Drinks per session: 3 or 4     Comment: occasional     Drug use: No    Sexual activity: Not on file   Lifestyle    Physical activity:     Days per week: Not on file     Minutes per session: Not on file    Stress: Not on file   Relationships    Social connections:     Talks on phone: Not on file     Gets together: Not on file     Attends Zoroastrianism service: Not on file     Active member of club or organization: Not on file     Attends meetings of clubs or organizations: Not on file     Relationship status: Not on file    Intimate partner violence:     Fear of current or ex partner: Not on file     Emotionally abused: Not on file     Physically abused: Not on file     Forced sexual activity: Not on file   Other Topics Concern    Not on file   Social History Narrative    Not on file      Family History   Problem Relation Age of Onset    Stroke Father         CEREBROVASCULAR ACCIDENT     Past Surgical History:   Procedure Laterality Date    BACK SURGERY      LIPECTOMY      L5; LAST ASSESSED: 9/11/14    [place] COLONOSCOPY FLX DX W/COLLJ SPEC WHEN PFRMD N/A 11/14/2016 Procedure: COLONOSCOPY;  Surgeon: Ryne Ac MD;  Location: [facility]; Service: Gastroenterology       Current Outpatient Medications:     Cholecalciferol (VITAMIN D) 2000 units tablet, Take 2,000 Units by mouth daily, Disp: , Rfl:   No Known Allergies    Social, Family and medication history as listed, reviewed and updated as necessary    Labs:   Lab Results   Component Value Date    K 3 3 (L) 03/03/2020     03/03/2020    CO2 26 03/03/2020    BUN 13 03/03/2020    CREATININE 0 95 03/03/2020    CREATININE 0 75 03/03/2020    CALCIUM 8 4 03/03/2020       Lab Results   Component Value Date    WBC 4 79 03/03/2020    HGB 15 0 03/03/2020    HCT 44 3 03/03/2020     03/03/2020       No results found for: CHOL  No results found for: HDL  No results found for: LDLCALC  No results found for: TRIG  No results found for: LDLDIRECT    Lab Results   Component Value Date    ALT 26 03/03/2020    ALT 23 03/03/2020    AST 16 03/03/2020    AST 8 03/03/2020             No results found for: NTBNP    No results found for: HGBA1C    Imaging: Reviewed in epic      Review of Systems:  14 systems reviewed and negative with exception of the above       PHYSICAL EXAM:        Vitals:    03/18/20 0750   BP: 106/70   Pulse: 72   SpO2: 99%     Body mass index is 26 33 kg/m²  Weight (last 2 days)     Date/Time   Weight    03/18/20 0750   82 1 (180 9)                 Gen: No acute distress  HEENT: anicteric, mucous membranes moist  Neck: supple, no jugular venous distention, or carotid bruit  Heart: regular, normal s1 and s2, no murmur/rub or gallop  Lungs :clear to auscultation bilaterally, no rales/rhonchi or wheeze  Abdomen: soft nontender, normoactive bowel sounds, no organomegaly  Ext: warm and perfused, normal femoral pulses, no edema, or clubbing  Skin: warm, no rashes  Neuro: AAO x 3, no focal findings  Psychiatric: normal affect  Musculoskeletal: no obvious joint deformities

## 2020-03-19 ENCOUNTER — TELEPHONE (OUTPATIENT)
Dept: INTERNAL MEDICINE CLINIC | Facility: CLINIC | Age: 55
End: 2020-03-19

## 2020-03-19 DIAGNOSIS — R93.89 ABNORMAL MRI: Primary | ICD-10-CM

## 2020-03-19 NOTE — TELEPHONE ENCOUNTER
Yes, I have put an order in to the chart please have the patient go to the lab to complete this blood test

## 2020-03-19 NOTE — TELEPHONE ENCOUNTER
Pt has a question about his MRI  He wants to know about Lyme's Disease  He is an avid camper and he wonders if those white dots on his MRI could be from Lyme's and if he should be tested for that  Please, advise

## 2020-03-23 ENCOUNTER — TELEPHONE (OUTPATIENT)
Dept: INTERNAL MEDICINE CLINIC | Facility: CLINIC | Age: 55
End: 2020-03-23

## 2020-03-23 NOTE — TELEPHONE ENCOUNTER
See the results note    I think it would be a good idea to go for the Holter but he has seen a cardiologist and it is up to the cardiologist

## 2020-03-23 NOTE — TELEPHONE ENCOUNTER
Pt calling in to ask for his lyme results also wanting to know if it is necessary he have the Holter Monitor done?  Please advise ty

## 2020-03-23 NOTE — TELEPHONE ENCOUNTER
Spoke to patient and advised  He states that he saw the cardiologist who felt his symptoms were not cardiac related so the holter is unnecessary  Please advise

## 2020-03-25 ENCOUNTER — TELEMEDICINE (OUTPATIENT)
Dept: INTERNAL MEDICINE CLINIC | Facility: CLINIC | Age: 55
End: 2020-03-25
Payer: COMMERCIAL

## 2020-03-25 DIAGNOSIS — E87.6 HYPOKALEMIA: ICD-10-CM

## 2020-03-25 DIAGNOSIS — R93.89 ABNORMAL MRI: ICD-10-CM

## 2020-03-25 DIAGNOSIS — R55 SYNCOPE, UNSPECIFIED SYNCOPE TYPE: Primary | ICD-10-CM

## 2020-03-25 DIAGNOSIS — H57.04: ICD-10-CM

## 2020-03-25 PROCEDURE — 99214 OFFICE O/P EST MOD 30 MIN: CPT | Performed by: INTERNAL MEDICINE

## 2020-03-25 NOTE — PROGRESS NOTES
Virtual Regular Visit    Problem List Items Addressed This Visit        Cardiovascular and Mediastinum    Syncope - Primary     Likely vasovagal patient has seen Cardiology and felt to be vasovagal syncope; at this point time the patient would like to hold off on diagnostic testing because of the covid-19 crisis, I did check with Cardiology and they are okay with this also  From a cardiac standpoint the patient is okay to go back to driving, the patient has EEG that is negative therefore will not need a video EEG  his symptoms were not compatible with seizure and there was no seizure activity  Also patient will be seeing Neurology regarding the MRI which does show white matter changes  The patient has not had any further symptoms he is drinking adequate amounts of fluids he is eating routinely and feels very good  He will likely be able to return back to driving without any issue we await the Neurology opinion  Other    Fixed dilated pupil     Patient did see the ophthalmologist felt to be secondary to a chemical that was on his hand at the time of the examination  His MRI did not show any pathology that would account for his findings and also the patient did see Ophthalmology and they did not find any major problems he will have a copy that report sent to me           Hypokalemia     Repleted  will recheck a potassium level patient initially had hyperkalemia but when in the ER he had significant hypokalemia which was repleted will check a BMP and have the patient see Nephrology I did review the laboratories we did not find any concern for adrenal abnormality         Relevant Orders    Basic metabolic panel    Ambulatory referral to Nephrology      Other Visit Diagnoses     Abnormal MRI        Relevant Orders    Sedimentation rate, automated        Return to office 1  months  call if any problems       Reason for visit is syncopal episode, abnormal MRI and fixed dilated pupil; dated patient is here to review his laboratories in testing and consultations    Encounter provider Magdalene Wright DO    Provider located at [address]  [address] [facility] [address]      Recent Visits  Date Type Provider Dept   [facility] [id number] [facility] [address]   03/23/20 [address]phone [address] recent visits within past 7 days and meeting all other requirements     Future Appointments  No visits were found meeting these conditions  Showing future appointments within next 150 days and meeting all other requirements        After connecting through [employer], the patient was identified by name and date of birth  Dottie Raymond was informed that this is a telemedicine visit and that the visit is being conducted through [employer] which may not be secure and therefore, might not be HIPAA-compliant  My office door was closed  No one else was in the room  He acknowledged consent and understanding of privacy and security of the video platform  The patient has agreed to participate and understands they can discontinue the visit at any time  Subjective  Dottie Raymond is a 49 y o  male follow-up examination syncope, fixed dilated pupil, abnormal MRI The patient reports me compliant taking medications without untoward side effects the  The patient is here to review his medical condition, update me on the medical condition and the patient reports me no hospitalizations and no ER visits  He is here to review his laboratories, testing and consultations  The pt reports camper  No migraines; the pt went to [facility] the pt did extreme hiking   No further incide;; the pt saw optho  and the pupil was constricting slower and he had a chemical that he rubbed on the eye     Had full eye exam         Past Medical History:   Diagnosis Date    Hyperlipidemia     Peptic ulcer     LAST ASSESSED: 9/11/14    Vitamin D deficiency     LAST ASSESSED: 9/17/15       Past Surgical History:   Procedure Laterality Date    BACK SURGERY      LIPECTOMY      L5; LAST ASSESSED: 9/11/14    [place] COLONOSCOPY FLX DX W/COLLJ SPEC WHEN PFRMD N/A 11/14/2016    Procedure: COLONOSCOPY;  Surgeon: Yara Garcia MD;  Location: [facility] LAB; Service: Gastroenterology       Current Outpatient Medications   Medication Sig Dispense Refill    Cholecalciferol (VITAMIN D) 2000 units tablet Take 2,000 Units by mouth daily       No current facility-administered medications for this visit  No Known Allergies    Review of Systems   Constitutional: Negative for activity change  Eyes: Negative for visual disturbance  Respiratory: Negative for cough and shortness of breath  Cardiovascular: Negative for chest pain  Neurological: Negative for dizziness, syncope, light-headedness and headaches  Physical Exam     I spent 25  minutes with the patient during this visit

## 2020-03-29 NOTE — ASSESSMENT & PLAN NOTE
Likely vasovagal patient has seen Cardiology and felt to be vasovagal syncope; at this point time the patient would like to hold off on diagnostic testing because of the covid-19 crisis, I did check with Cardiology and they are okay with this also  From a cardiac standpoint the patient is okay to go back to driving, the patient has EEG that is negative therefore will not need a video EEG  his symptoms were not compatible with seizure and there was no seizure activity  Also patient will be seeing Neurology regarding the MRI which does show white matter changes  The patient has not had any further symptoms he is drinking adequate amounts of fluids he is eating routinely and feels very good  He will likely be able to return back to driving without any issue we await the Neurology opinion

## 2020-03-29 NOTE — ASSESSMENT & PLAN NOTE
Repleted  will recheck a potassium level patient initially had hyperkalemia but when in the ER he had significant hypokalemia which was repleted will check a BMP and have the patient see Nephrology I did review the laboratories we did not find any concern for adrenal abnormality

## 2020-03-29 NOTE — ASSESSMENT & PLAN NOTE
Patient did see the ophthalmologist felt to be secondary to a chemical that was on his hand at the time of the examination  His MRI did not show any pathology that would account for his findings and also the patient did see Ophthalmology and they did not find any major problems he will have a copy that report sent to me

## 2020-04-02 ENCOUNTER — TELEPHONE (OUTPATIENT)
Dept: INTERNAL MEDICINE CLINIC | Facility: CLINIC | Age: 55
End: 2020-04-02

## 2020-04-22 ENCOUNTER — TELEPHONE (OUTPATIENT)
Dept: NEUROLOGY | Facility: CLINIC | Age: 55
End: 2020-04-22

## 2020-05-04 ENCOUNTER — TELEMEDICINE (OUTPATIENT)
Dept: NEUROLOGY | Facility: CLINIC | Age: 55
End: 2020-05-04
Payer: COMMERCIAL

## 2020-05-04 VITALS — BODY MASS INDEX: 24.62 KG/M2 | HEIGHT: 70 IN | WEIGHT: 172 LBS

## 2020-05-04 DIAGNOSIS — H57.04: ICD-10-CM

## 2020-05-04 DIAGNOSIS — R55 SYNCOPE, UNSPECIFIED SYNCOPE TYPE: ICD-10-CM

## 2020-05-04 PROCEDURE — 3008F BODY MASS INDEX DOCD: CPT | Performed by: INTERNAL MEDICINE

## 2020-05-04 PROCEDURE — 99203 OFFICE O/P NEW LOW 30 MIN: CPT | Performed by: PSYCHIATRY & NEUROLOGY

## 2020-05-07 ENCOUNTER — TELEMEDICINE (OUTPATIENT)
Dept: INTERNAL MEDICINE CLINIC | Facility: CLINIC | Age: 55
End: 2020-05-07
Payer: COMMERCIAL

## 2020-05-07 DIAGNOSIS — R97.20 ELEVATED PSA: ICD-10-CM

## 2020-05-07 DIAGNOSIS — R55 SYNCOPE, UNSPECIFIED SYNCOPE TYPE: Primary | ICD-10-CM

## 2020-05-07 DIAGNOSIS — Z13.1 SCREENING FOR DIABETES MELLITUS: ICD-10-CM

## 2020-05-07 DIAGNOSIS — Z13.6 SCREENING FOR CARDIOVASCULAR CONDITION: ICD-10-CM

## 2020-05-07 PROCEDURE — 99213 OFFICE O/P EST LOW 20 MIN: CPT | Performed by: INTERNAL MEDICINE

## 2020-06-30 ENCOUNTER — DOCUMENTATION (OUTPATIENT)
Dept: NEPHROLOGY | Facility: CLINIC | Age: 55
End: 2020-06-30

## 2020-12-02 LAB — HBA1C MFR BLD HPLC: 5.5 %

## 2020-12-10 ENCOUNTER — OFFICE VISIT (OUTPATIENT)
Dept: INTERNAL MEDICINE CLINIC | Facility: CLINIC | Age: 55
End: 2020-12-10
Payer: COMMERCIAL

## 2020-12-10 VITALS
HEIGHT: 70 IN | OXYGEN SATURATION: 98 % | RESPIRATION RATE: 16 BRPM | WEIGHT: 166.2 LBS | DIASTOLIC BLOOD PRESSURE: 62 MMHG | HEART RATE: 69 BPM | SYSTOLIC BLOOD PRESSURE: 114 MMHG | BODY MASS INDEX: 23.79 KG/M2

## 2020-12-10 DIAGNOSIS — E78.5 HYPERLIPIDEMIA, UNSPECIFIED HYPERLIPIDEMIA TYPE: ICD-10-CM

## 2020-12-10 DIAGNOSIS — Z12.5 SCREENING PSA (PROSTATE SPECIFIC ANTIGEN): ICD-10-CM

## 2020-12-10 DIAGNOSIS — E78.49 OTHER HYPERLIPIDEMIA: ICD-10-CM

## 2020-12-10 DIAGNOSIS — Z13.6 SCREENING FOR CARDIOVASCULAR CONDITION: ICD-10-CM

## 2020-12-10 DIAGNOSIS — E55.9 VITAMIN D DEFICIENCY: ICD-10-CM

## 2020-12-10 DIAGNOSIS — Z00.00 WELLNESS EXAMINATION: ICD-10-CM

## 2020-12-10 DIAGNOSIS — Z12.11 SCREEN FOR COLON CANCER: Primary | ICD-10-CM

## 2020-12-10 PROCEDURE — 1036F TOBACCO NON-USER: CPT | Performed by: INTERNAL MEDICINE

## 2020-12-10 PROCEDURE — 3008F BODY MASS INDEX DOCD: CPT | Performed by: INTERNAL MEDICINE

## 2020-12-10 PROCEDURE — 99396 PREV VISIT EST AGE 40-64: CPT | Performed by: INTERNAL MEDICINE

## 2020-12-10 PROCEDURE — 3725F SCREEN DEPRESSION PERFORMED: CPT | Performed by: INTERNAL MEDICINE

## 2020-12-21 ENCOUNTER — TELEPHONE (OUTPATIENT)
Dept: GASTROENTEROLOGY | Facility: CLINIC | Age: 55
End: 2020-12-21

## 2020-12-29 ENCOUNTER — PREP FOR PROCEDURE (OUTPATIENT)
Dept: GASTROENTEROLOGY | Facility: CLINIC | Age: 55
End: 2020-12-29

## 2020-12-29 DIAGNOSIS — Z86.010 HISTORY OF COLON POLYPS: Primary | ICD-10-CM

## 2020-12-29 DIAGNOSIS — Z12.11 SCREENING FOR COLON CANCER: Primary | ICD-10-CM

## 2020-12-29 RX ORDER — SODIUM, POTASSIUM,MAG SULFATES 17.5-3.13G
SOLUTION, RECONSTITUTED, ORAL ORAL
Qty: 2 BOTTLE | Refills: 0 | Status: SHIPPED | OUTPATIENT
Start: 2020-12-29 | End: 2021-02-25

## 2021-02-11 ENCOUNTER — ANESTHESIA EVENT (OUTPATIENT)
Dept: GASTROENTEROLOGY | Facility: AMBULARY SURGERY CENTER | Age: 56
End: 2021-02-11

## 2021-02-25 ENCOUNTER — HOSPITAL ENCOUNTER (OUTPATIENT)
Dept: GASTROENTEROLOGY | Facility: AMBULARY SURGERY CENTER | Age: 56
Setting detail: OUTPATIENT SURGERY
Discharge: HOME | End: 2021-02-25
Attending: INTERNAL MEDICINE | Admitting: INTERNAL MEDICINE
Payer: COMMERCIAL

## 2021-02-25 ENCOUNTER — ANESTHESIA (OUTPATIENT)
Dept: GASTROENTEROLOGY | Facility: AMBULARY SURGERY CENTER | Age: 56
End: 2021-02-25

## 2021-02-25 VITALS
TEMPERATURE: 97.1 F | RESPIRATION RATE: 18 BRPM | DIASTOLIC BLOOD PRESSURE: 76 MMHG | HEART RATE: 74 BPM | OXYGEN SATURATION: 98 % | SYSTOLIC BLOOD PRESSURE: 124 MMHG

## 2021-02-25 DIAGNOSIS — Z86.010 HISTORY OF COLON POLYPS: ICD-10-CM

## 2021-02-25 PROCEDURE — 45385 COLONOSCOPY W/LESION REMOVAL: CPT | Performed by: INTERNAL MEDICINE

## 2021-02-25 PROCEDURE — 88305 TISSUE EXAM BY PATHOLOGIST: CPT | Performed by: PATHOLOGY

## 2021-02-25 RX ORDER — PROPOFOL 10 MG/ML
INJECTION, EMULSION INTRAVENOUS AS NEEDED
Status: DISCONTINUED | OUTPATIENT
Start: 2021-02-25 | End: 2021-02-25

## 2021-02-25 RX ORDER — SODIUM CHLORIDE, SODIUM LACTATE, POTASSIUM CHLORIDE, CALCIUM CHLORIDE 600; 310; 30; 20 MG/100ML; MG/100ML; MG/100ML; MG/100ML
125 INJECTION, SOLUTION INTRAVENOUS CONTINUOUS
Status: DISCONTINUED | OUTPATIENT
Start: 2021-02-25 | End: 2021-03-01 | Stop reason: HOSPADM

## 2021-02-25 RX ORDER — SODIUM CHLORIDE, SODIUM LACTATE, POTASSIUM CHLORIDE, CALCIUM CHLORIDE 600; 310; 30; 20 MG/100ML; MG/100ML; MG/100ML; MG/100ML
20 INJECTION, SOLUTION INTRAVENOUS CONTINUOUS
Status: CANCELLED | OUTPATIENT
Start: 2021-02-25

## 2021-02-25 RX ORDER — ONDANSETRON 2 MG/ML
4 INJECTION INTRAMUSCULAR; INTRAVENOUS ONCE AS NEEDED
Status: CANCELLED | OUTPATIENT
Start: 2021-02-25

## 2021-02-25 RX ADMIN — PROPOFOL 50 MG: 10 INJECTION, EMULSION INTRAVENOUS at 13:27

## 2021-02-25 RX ADMIN — PROPOFOL 50 MG: 10 INJECTION, EMULSION INTRAVENOUS at 13:31

## 2021-02-25 RX ADMIN — PROPOFOL 50 MG: 10 INJECTION, EMULSION INTRAVENOUS at 13:34

## 2021-02-25 RX ADMIN — PROPOFOL 20 MG: 10 INJECTION, EMULSION INTRAVENOUS at 13:23

## 2021-02-25 RX ADMIN — PROPOFOL 80 MG: 10 INJECTION, EMULSION INTRAVENOUS at 13:22

## 2021-02-25 RX ADMIN — SODIUM CHLORIDE, SODIUM LACTATE, POTASSIUM CHLORIDE, AND CALCIUM CHLORIDE: .6; .31; .03; .02 INJECTION, SOLUTION INTRAVENOUS at 12:29

## 2021-02-25 RX ADMIN — Medication 40 MG: at 13:25

## 2021-02-25 RX ADMIN — PROPOFOL 50 MG: 10 INJECTION, EMULSION INTRAVENOUS at 13:25

## 2021-02-25 NOTE — H&P
History and Physical - [facility]  George Almonte 48 y o  male MRN: [id number]    HPI: George Almonte is a 48y o  year old male who presents for surveillance colonoscopy  He has personal history of colon polyps  Last colonoscopy in 2016      Review of Systems    Historical Information   Past Medical History:   Diagnosis Date    Hyperlipidemia     Peptic ulcer     LAST ASSESSED: 9/11/14    Vitamin D deficiency     LAST ASSESSED: 9/17/15     Past Surgical History:   Procedure Laterality Date    BACK SURGERY      LIPECTOMY      L5; LAST ASSESSED: 9/11/14    [place] COLONOSCOPY FLX DX W/COLLJ SPEC WHEN PFRMD N/A 11/14/2016    Procedure: COLONOSCOPY;  Surgeon: Zain Kowalski MD;  Location: [facility]; Service: Gastroenterology    REFRACTIVE SURGERY       Social History   Social History     Substance and Sexual Activity   Alcohol Use Yes    Frequency: 2-3 times a week    Drinks per session: 3 or 4    Comment: occasional      Social History     Substance and Sexual Activity   Drug Use No     Social History     Tobacco Use   Smoking Status Never Smoker   Smokeless Tobacco Never Used     Family History   Problem Relation Age of Onset    Stroke Father         CEREBROVASCULAR ACCIDENT    Dementia Mother     Seizures Neg Hx        Meds/Allergies     (Not in a hospital admission)      No Known Allergies    Objective     There were no vitals taken for this visit  PHYSICAL EXAM    Gen: NAD  CV: RRR  CHEST: Clear  ABD: soft, NT/ND  EXT: no edema  Neuro: AAO      ASSESSMENT/PLAN:  This is a 48y o  year old male here for surveillance colonoscopy for personal history of colon polyps      PLAN:   Procedure:  Colonoscopy with biopsy and possible polypectomy

## 2021-02-25 NOTE — ANESTHESIA PREPROCEDURE EVALUATION
Procedure:  COLONOSCOPY    Relevant Problems   CARDIO   (+) Other hyperlipidemia      Other   (+) Seasonal allergic rhinitis        Physical Exam    Airway    Mallampati score: III  TM Distance: >3 FB  Neck ROM: full     Dental   No notable dental hx     Cardiovascular      Pulmonary      Other Findings        Anesthesia Plan  ASA Score- 2     Anesthesia Type- IV sedation with anesthesia with ASA Monitors  Additional Monitors:   Airway Plan:           Plan Factors-    Chart reviewed  Existing labs reviewed  Patient summary reviewed  Induction- intravenous  Postoperative Plan-     Informed Consent- Anesthetic plan and risks discussed with patient  I personally reviewed this patient with the CRNA  Discussed and agreed on the Anesthesia Plan with the CRNA  Israel Paz

## 2021-02-25 NOTE — ANESTHESIA POSTPROCEDURE EVALUATION
Post-Op Assessment Note    CV Status:  Stable  Pain Score: 0    Pain management: adequate     Mental Status:  Alert and awake   Hydration Status:  Euvolemic   PONV Controlled:  Controlled   Airway Patency:  Patent      Post Op Vitals Reviewed: Yes      Staff: CRNA         No complications documented      BP   130/77   Temp      Pulse  61   Resp   17   SpO2   99

## 2021-12-07 ENCOUNTER — RA CDI HCC (OUTPATIENT)
Dept: OTHER | Facility: HOSPITAL | Age: 56
End: 2021-12-07

## 2021-12-13 ENCOUNTER — OFFICE VISIT (OUTPATIENT)
Dept: INTERNAL MEDICINE CLINIC | Facility: CLINIC | Age: 56
End: 2021-12-13
Payer: COMMERCIAL

## 2021-12-13 VITALS
SYSTOLIC BLOOD PRESSURE: 124 MMHG | BODY MASS INDEX: 24.77 KG/M2 | HEART RATE: 85 BPM | WEIGHT: 173 LBS | HEIGHT: 70 IN | DIASTOLIC BLOOD PRESSURE: 78 MMHG | OXYGEN SATURATION: 99 %

## 2021-12-13 DIAGNOSIS — R97.20 ELEVATED PROSTATE SPECIFIC ANTIGEN (PSA): ICD-10-CM

## 2021-12-13 DIAGNOSIS — Z11.4 SCREENING FOR HIV (HUMAN IMMUNODEFICIENCY VIRUS): ICD-10-CM

## 2021-12-13 DIAGNOSIS — Z11.59 NEED FOR HEPATITIS C SCREENING TEST: ICD-10-CM

## 2021-12-13 DIAGNOSIS — Z00.00 WELLNESS EXAMINATION: ICD-10-CM

## 2021-12-13 DIAGNOSIS — R97.20 ELEVATED PSA: ICD-10-CM

## 2021-12-13 DIAGNOSIS — Z00.00 ANNUAL PHYSICAL EXAM: Primary | ICD-10-CM

## 2021-12-13 DIAGNOSIS — E78.49 OTHER HYPERLIPIDEMIA: ICD-10-CM

## 2021-12-13 DIAGNOSIS — Z12.5 SCREENING PSA (PROSTATE SPECIFIC ANTIGEN): ICD-10-CM

## 2021-12-13 PROCEDURE — 3725F SCREEN DEPRESSION PERFORMED: CPT | Performed by: INTERNAL MEDICINE

## 2021-12-13 PROCEDURE — 3008F BODY MASS INDEX DOCD: CPT | Performed by: INTERNAL MEDICINE

## 2021-12-13 PROCEDURE — 99396 PREV VISIT EST AGE 40-64: CPT | Performed by: INTERNAL MEDICINE

## 2021-12-13 PROCEDURE — 1036F TOBACCO NON-USER: CPT | Performed by: INTERNAL MEDICINE

## 2022-03-22 ENCOUNTER — TELEMEDICINE (OUTPATIENT)
Dept: INTERNAL MEDICINE CLINIC | Facility: CLINIC | Age: 57
End: 2022-03-22
Payer: COMMERCIAL

## 2022-03-22 DIAGNOSIS — Z11.4 SCREENING FOR HIV (HUMAN IMMUNODEFICIENCY VIRUS): ICD-10-CM

## 2022-03-22 DIAGNOSIS — B97.89 VIRAL SINUSITIS: Primary | ICD-10-CM

## 2022-03-22 DIAGNOSIS — Z11.59 NEED FOR HEPATITIS C SCREENING TEST: ICD-10-CM

## 2022-03-22 DIAGNOSIS — J32.9 VIRAL SINUSITIS: Primary | ICD-10-CM

## 2022-03-22 PROCEDURE — 99213 OFFICE O/P EST LOW 20 MIN: CPT | Performed by: INTERNAL MEDICINE

## 2022-03-22 PROCEDURE — 1036F TOBACCO NON-USER: CPT | Performed by: INTERNAL MEDICINE

## 2022-03-22 RX ORDER — FLUTICASONE PROPIONATE 50 MCG
2 SPRAY, SUSPENSION (ML) NASAL DAILY
Qty: 1 G | Refills: 2 | Status: SHIPPED | OUTPATIENT
Start: 2022-03-22

## 2022-03-22 NOTE — PROGRESS NOTES
Virtual Regular Visit    Verification of patient location:    Patient is located in the following state in which I hold an active license [place]      Assessment/Plan:    Problem List Items Addressed This Visit        Respiratory    Viral sinusitis - Primary     Resolving viral sinusitis symptoms are improving as compared to yesterday recommend Flonase 2 sprays each nostril once daily times 10 days plenty of fluids and rest hold Claritin for now until symptoms resolve if any change in the symptoms worsening fever or chills sinus pain please notify me for antibiotics RTO as scheduled call if any problems         Relevant Medications    fluticasone (FLONASE) 50 mcg/act nasal spray      Other Visit Diagnoses     Need for hepatitis C screening test        Screening for HIV (human immunodeficiency virus)                   Reason for visit is   Chief Complaint   Patient presents with    Virtual Regular Visit        Encounter provider Alcides Stout DO    Provider located at [address] [facility]  [address] [place] [id number]      Recent Visits  No visits were found meeting these conditions  Showing recent visits within past 7 days and meeting all other requirements  Today's Visits  Date Type Provider Dept   [facility]/22 [address] [facility] today's visits and meeting all other requirements  Future Appointments  No visits were found meeting these conditions  Showing future appointments within next 150 days and meeting all other requirements       The patient was identified by name and date of birth  Jackie Regan was informed that this is a telemedicine visit and that the visit is being conducted through "[employer]  My office door was closed  No one else was in the room  He acknowledged consent and understanding of privacy and security of the video platform   The patient has agreed to participate and understands they can discontinue the visit at any time  Patient is aware this is a billable service  Subjective  Yissel Blue is a 55 y o  male viral sinusitis   HPI yesterday head congestion , coughed green mucous took tylenol cold and flu , st from the pnd , last night good night , today 10% congestion , today %,  ? Allergies ,  Home cov test negative no pain and pressssure took tylenol cold and flu  No f/ c,  Current sx nasal congestion, pnd    Past Medical History:   Diagnosis Date    Colon polyp     Hyperlipidemia     Peptic ulcer     LAST ASSESSED: 9/11/14    Vitamin D deficiency     LAST ASSESSED: 9/17/15       Past Surgical History:   Procedure Laterality Date    BACK SURGERY      LIPECTOMY      L5; LAST ASSESSED: 9/11/14    [place] COLONOSCOPY FLX DX W/COLLJ SPEC WHEN PFRMD N/A 11/14/2016    Procedure: COLONOSCOPY;  Surgeon: Lucinda Benitez MD;  Location: [facility] GI LAB; Service: Gastroenterology    REFRACTIVE SURGERY         Current Outpatient Medications   Medication Sig Dispense Refill    Cholecalciferol (VITAMIN D) 2000 units tablet Take 2,000 Units by mouth daily      fluticasone (FLONASE) 50 mcg/act nasal spray 2 sprays into each nostril daily 1 g 2     No current facility-administered medications for this visit  No Known Allergies    Review of Systems   Constitutional: Negative for appetite change, chills and fever  HENT: Positive for congestion and postnasal drip  Negative for dental problem, ear pain, rhinorrhea, sinus pressure, sinus pain and sore throat  Eyes: Negative for itching  Respiratory: Negative for cough, shortness of breath and wheezing  Neurological: Negative for headaches  Video Exam    There were no vitals filed for this visit  Physical Exam     I spent 20 minutes directly with the patient during this visit    VIRT[address] VISIT [address] verbally agrees to participate in [employer]   Pt is aware that Virtual Care Services could be limited without vital signs or the ability to perform a full hands-on physical Jessee Lubin understands he or the provider may request at any time to terminate the video visit and request the patient to seek care or treatment in person

## 2022-03-23 NOTE — ASSESSMENT & PLAN NOTE
Resolving viral sinusitis symptoms are improving as compared to yesterday recommend Flonase 2 sprays each nostril once daily times 10 days plenty of fluids and rest hold Claritin for now until symptoms resolve if any change in the symptoms worsening fever or chills sinus pain please notify me for antibiotics RTO as scheduled call if any problems

## 2022-10-11 PROBLEM — B97.89 VIRAL SINUSITIS: Status: RESOLVED | Noted: 2022-03-22 | Resolved: 2022-10-11

## 2022-10-11 PROBLEM — J32.9 VIRAL SINUSITIS: Status: RESOLVED | Noted: 2022-03-22 | Resolved: 2022-10-11

## 2023-11-06 ENCOUNTER — TELEPHONE (OUTPATIENT)
Age: 58
End: 2023-11-06

## 2023-11-06 NOTE — TELEPHONE ENCOUNTER
Ledy Salazar is calling to schedule a physical.  Is it correct that DR. Peck's does not have anything until April. Just double checking with the office.   Thank you

## 2023-11-16 ENCOUNTER — OFFICE VISIT (OUTPATIENT)
Dept: INTERNAL MEDICINE CLINIC | Facility: CLINIC | Age: 58
End: 2023-11-16

## 2023-11-16 VITALS
RESPIRATION RATE: 16 BRPM | HEART RATE: 73 BPM | OXYGEN SATURATION: 98 % | HEIGHT: 69 IN | SYSTOLIC BLOOD PRESSURE: 118 MMHG | WEIGHT: 167.4 LBS | BODY MASS INDEX: 24.79 KG/M2 | DIASTOLIC BLOOD PRESSURE: 74 MMHG

## 2023-11-16 DIAGNOSIS — Z11.59 NEED FOR HEPATITIS C SCREENING TEST: ICD-10-CM

## 2023-11-16 DIAGNOSIS — Z00.00 ANNUAL PHYSICAL EXAM: Primary | ICD-10-CM

## 2023-11-16 DIAGNOSIS — E55.9 VITAMIN D DEFICIENCY: ICD-10-CM

## 2023-11-16 NOTE — ASSESSMENT & PLAN NOTE
Patient her for annual physical exam  Missed last years annual physical  Exercises and eats healthy  Not uptodate on immunization- not interested in getting Flu shot or Covid vaccine, reported that he will consider getting Shingle vaccine next year  Non smoker and occasional alcohol use  Recent colonoscopy in 2020  Due for PSA  P/E unremarkable  Plan  Cbc, CMP, Hba1c, lipid panel, PSA  VIT D levels  Counseled regarding importance of getting vaccinations  Follow up in one year

## 2023-11-16 NOTE — PROGRESS NOTES
Assessment/Plan:    No problem-specific Assessment & Plan notes found for this encounter. Diagnoses and all orders for this visit:    Need for hepatitis C screening test          Subjective:      Patient ID: Jose Dixon is a 63 y.o. male. HPI  63year old male with no significant past history presenting today for annual physical exam.   Exercises daily 25 minutes a day- walking and lifts weights, follows a healthy vegan diet  Immunization-haven't gotten shingles vaccine, not interested in getting the  vaccinations today, may consider in the future  Covid vaccine - only one dose  Flu vaccine - not interested in getting it today  Non smoker , social drinker  Colonoscopy- last one in 2020  PSA - 2 years back  The following portions of the patient's history were reviewed and updated as appropriate: allergies, current medications, past family history, past medical history, past social history, past surgical history, and problem list.    Review of Systems   Constitutional:  Negative for chills and fever. HENT:  Negative for sore throat. Trouble swallowing: psa. Eyes:  Negative for visual disturbance. Respiratory:  Negative for cough and shortness of breath. Cardiovascular:  Negative for chest pain and palpitations. Gastrointestinal:  Negative for abdominal pain, constipation, diarrhea, nausea and vomiting. Genitourinary:  Negative for frequency, hematuria and urgency. Neurological:  Negative for headaches.          Objective:      /74   Pulse 73   Resp 16   Ht 5' 9" (1.753 m)   Wt 75.9 kg (167 lb 6.4 oz)   SpO2 98%   BMI 24.72 kg/m²          Physical Exam

## 2023-11-16 NOTE — PROGRESS NOTES
[address] [facility]    NAME: Naomie Oconnell  AGE: 63 y.o. SEX: male  : 1965     DATE: 2023     Assessment and Plan:     Problem List Items Addressed This Visit       Annual physical exam - Primary     Patient her for annual physical exam  Missed last years annual physical  Exercises and eats healthy  Not uptodate on immunization- not interested in getting Flu shot or Covid vaccine, reported that he will consider getting Shingle vaccine next year  Non smoker and occasional alcohol use  Recent colonoscopy in   Due for PSA  P/E unremarkable  Plan  Cbc, CMP, Hba1c, lipid panel, PSA  VIT D levels  Counseled regarding importance of getting vaccinations  Follow up in one year           Relevant Orders    CBC and differential    Comprehensive metabolic panel    Hemoglobin A1C    Lipid Panel with Direct LDL reflex    PSA, total and free    Vitamin D 25 hydroxy    Vitamin D deficiency     Patient has h/o Vit D deficiency  Takes Cholecalceferol 2000U daily  No fatigue , myalgia or arthralgia  Plan  Continue Cholecalceferol 2000u   25 hydroxy Vit D check         Relevant Orders    Vitamin D 25 hydroxy     Other Visit Diagnoses       Need for hepatitis C screening test        Relevant Orders    Hepatitis C Antibody            Immunizations and preventive care screenings were discussed with patient today. Appropriate education was printed on patient's after visit summary. Discussed risks and benefits of prostate cancer screening. We discussed the controversial history of PSA screening for prostate cancer in the [facility] as well as the risk of over detection and over treatment of prostate cancer by way of PSA screening.   The patient understands that PSA blood testing is an imperfect way to screen for prostate cancer and that elevated PSA levels in the blood may also be caused by infection, inflammation, prostatic trauma or manipulation, urological procedures, or by benign prostatic enlargement. The role of the digital rectal examination in prostate cancer screening was also discussed and I discussed with him that there is large interobserver variability in the findings of digital rectal examination. Depression Screening and Follow-up Plan: Patient was screened for depression during today's encounter. They screened negative with a PHQ-2 score of 0. Return in about 1 year (around 11/16/2024). Chief Complaint:     Chief Complaint   Patient presents with    Annual Exam      History of Present Illness:   63year old male with no significant past history presenting today for annual physical exam.   Exercises daily 25 minutes a day- walking and lifts weights, follows a healthy vegan diet  Immunization-haven't gotten shingles vaccine, not interested in getting the  vaccinations today, may consider in the future  Covid vaccine - has gotten  only one dose, not interested in getting subsequent boosters  Flu vaccine - not interested in getting it today  Non smoker , social drinker  Colonoscopy- last one in 2020 showed a small polyp, due in 2025  PSA - 2 years back  No other complaints or concerns  The following portions of the patient's history were reviewed and updated as appropriate: allergies, current medications, past family history, past medical history, past social history, past surgical history, and problem list.  Adult Annual Physical   Patient here for a comprehensive physical exam. The patient reports no problems. Diet and Physical Activity  Diet/Nutrition: well balanced diet. Exercise: 5-7 times a week on average. Depression Screening  PHQ-2/9 Depression Screening    Little interest or pleasure in doing things: 0 - not at all  Feeling down, depressed, or hopeless: 0 - not at all  PHQ-2 Score: 0  PHQ-2 Interpretation: Negative depression screen       General Health  Sleep: sleeps well.    Hearing: normal - right. Vision: no vision problems. Dental: brushes teeth twice daily.  Health  Symptoms include: none         Review of Systems:     Review of Systems   Constitutional:  Negative for chills and fever. HENT:  Negative for sore throat. Eyes:  Negative for visual disturbance. Respiratory:  Negative for cough and shortness of breath. Cardiovascular:  Negative for chest pain and palpitations. Gastrointestinal:  Negative for abdominal pain, constipation, diarrhea, nausea and vomiting. Genitourinary:  Negative for frequency, hematuria and urgency. Neurological:  Negative for headaches. Past Medical History:     Past Medical History:   Diagnosis Date    Colon polyp     Hyperlipidemia     Peptic ulcer     LAST ASSESSED: 9/11/14    Vitamin D deficiency     LAST ASSESSED: 9/17/15      Past Surgical History:     Past Surgical History:   Procedure Laterality Date    BACK SURGERY      LIPECTOMY      L5; LAST ASSESSED: 9/11/14    [place] COLONOSCOPY FLX DX W/COLLJ SPEC WHEN PFRMD N/A 11/14/2016    Procedure: COLONOSCOPY;  Surgeon: Camilo Chow MD;  Location: [facility];   Service: Gastroenterology    REFRACTIVE SURGERY        Family History:     Family History   Problem Relation Age of Onset    Stroke Father         CEREBROVASCULAR ACCIDENT    Dementia Mother     Seizures Neg Hx       Social History:     Social History     Socioeconomic History    Marital status: /Civil Union     Spouse name: None    Number of children: None    Years of education: None    Highest education level: None   Occupational History    None   Tobacco Use    Smoking status: Never    Smokeless tobacco: Never   Vaping Use    Vaping Use: Never used   Substance and Sexual Activity    Alcohol use: Yes     Comment: occasional     Drug use: No    Sexual activity: Yes   Other Topics Concern    None   Social History Narrative    Lives in [place] with wife     Social Determinants of Health     Financial Resource Strain: Not on file   Food Insecurity: Not on file   Transportation Needs: Not on file   Physical Activity: Not on file   Stress: Not on file   Social Connections: Not on file   Intimate Partner Violence: Not on file   Housing Stability: Not on file      Current Medications:     Current Outpatient Medications   Medication Sig Dispense Refill    Cholecalciferol (VITAMIN D) 2000 units tablet Take 2,000 Units by mouth daily      fluticasone (FLONASE) 50 mcg/act nasal spray 2 sprays into each nostril daily 1 g 2     No current facility-administered medications for this visit. Allergies:     No Known Allergies   Physical Exam:     /74   Pulse 73   Resp 16   Ht 5' 9" (1.753 m)   Wt 75.9 kg (167 lb 6.4 oz)   SpO2 98%   BMI 24.72 kg/m²     Physical Exam  Vitals and nursing note reviewed. Constitutional:       Appearance: Normal appearance. HENT:      Mouth/Throat:      Mouth: Mucous membranes are moist.      Pharynx: Oropharynx is clear. Eyes:      Extraocular Movements: Extraocular movements intact. Pupils: Pupils are equal, round, and reactive to light. Cardiovascular:      Rate and Rhythm: Normal rate and regular rhythm. Pulmonary:      Effort: Pulmonary effort is normal.      Breath sounds: Normal breath sounds. Abdominal:      General: Abdomen is flat. Bowel sounds are normal.      Palpations: Abdomen is soft. Skin:     General: Skin is warm and dry. Capillary Refill: Capillary refill takes less than 2 seconds. Neurological:      General: No focal deficit present. Mental Status: He is alert and oriented to person, place, and time.           Felicia Rodriguez MD  [facility]

## 2023-11-16 NOTE — ASSESSMENT & PLAN NOTE
Patient has h/o Vit D deficiency  Takes Cholecalceferol 2000U daily  No fatigue , myalgia or arthralgia  Plan  Continue Cholecalceferol 2000u   25 hydroxy Vit D check

## 2024-02-21 PROBLEM — J01.00 ACUTE NON-RECURRENT MAXILLARY SINUSITIS: Status: RESOLVED | Noted: 2019-09-18 | Resolved: 2024-02-21

## 2024-08-19 ENCOUNTER — OFFICE VISIT (OUTPATIENT)
Dept: INTERNAL MEDICINE CLINIC | Facility: CLINIC | Age: 59
End: 2024-08-19
Payer: COMMERCIAL

## 2024-08-19 VITALS
BODY MASS INDEX: 24.82 KG/M2 | HEART RATE: 63 BPM | DIASTOLIC BLOOD PRESSURE: 70 MMHG | HEIGHT: 69 IN | SYSTOLIC BLOOD PRESSURE: 126 MMHG | WEIGHT: 167.6 LBS | OXYGEN SATURATION: 98 %

## 2024-08-19 DIAGNOSIS — M79.671 RIGHT FOOT PAIN: Primary | ICD-10-CM

## 2024-08-19 PROCEDURE — 99213 OFFICE O/P EST LOW 20 MIN: CPT | Performed by: INTERNAL MEDICINE

## 2024-08-19 NOTE — PROGRESS NOTES
Ambulatory Visit  Name: Fidencio Roman      : 1965      MRN: [id number]  Encounter Provider: Rufus Lang MD  Encounter Date: 2024   [facility]: [facility]    Assessment & Plan   1. Right foot pain  Assessment & Plan:  Will refer to podiatrist for possible plantar wart, will also check for the possibility of foreign body with an x-ray  Orders:  -     XR foot 3+ vw right; Future; Expected date: 2024  -     Ambulatory Referral to Podiatry; Future         History of Present Illness     I am seeing patient in coverage for an acute issue.  Patient concerned that he may have stepped on something with his right foot a few weeks ago.      Review of Systems   Constitutional:  Negative for chills and fever.     Past Medical History:   Diagnosis Date   • Colon polyp    • Hyperlipidemia    • Peptic ulcer     LAST ASSESSED: 14   • Vitamin D deficiency     LAST ASSESSED: 9/17/15     Past Surgical History:   Procedure Laterality Date   • BACK SURGERY     • LIPECTOMY      L5; LAST ASSESSED: 14   • [place] COLONOSCOPY FLX DX W/COLLJ SPEC WHEN PFRMD N/A 2016    Procedure: COLONOSCOPY;  Surgeon: Best Garcia MD;  Location: [facility];  Service: Gastroenterology   • REFRACTIVE SURGERY       Family History   Problem Relation Age of Onset   • Stroke Father         CEREBROVASCULAR ACCIDENT   • Dementia Mother    • Seizures Neg Hx      Social History     Tobacco Use   • Smoking status: Never   • Smokeless tobacco: Never   Vaping Use   • Vaping status: Never Used   Substance and Sexual Activity   • Alcohol use: Yes     Comment: occasional    • Drug use: No   • Sexual activity: Yes     Current Outpatient Medications on File Prior to Visit   Medication Sig   • Cholecalciferol (VITAMIN D) 2000 units tablet Take 2,000 Units by mouth daily   • fluticasone (FLONASE) 50 mcg/act nasal spray 2 sprays into each nostril daily     No Known Allergies  Immunization History   Administered  "Date(s) Administered   • COVID-19 J&J (Bryanna) vaccine 0.5 mL 04/07/2021   • Influenza Injectable, MDCK, Preservative Free, Quadrivalent, 0.5 mL 09/09/2019   • Influenza Quadrivalent Preservative Free 3 years and older IM 12/05/2017   • Influenza, seasonal, injectable 03/13/2011   • Tdap 12/05/2017     Objective     /70 (BP Location: Left arm, Patient Position: Sitting, Cuff Size: Standard)   Pulse 63   Ht 5' 9\" (1.753 m)   Wt 76 kg (167 lb 9.6 oz)   SpO2 98%   BMI 24.75 kg/m²     Physical Exam  Constitutional:       General: He is not in acute distress.     Appearance: Normal appearance.   Skin:     Comments: No foreign body noted, possible plantar wart when examined with magnifying glass lateral right sole of foot, no erythema, no palpable abnormality   Neurological:      Mental Status: He is alert.         "

## 2024-08-19 NOTE — ASSESSMENT & PLAN NOTE
Will refer to podiatrist for possible plantar wart, will also check for the possibility of foreign body with an x-ray

## 2024-08-19 NOTE — PATIENT INSTRUCTIONS
Problem List Items Addressed This Visit          Surgery/Wound/Pain    Right foot pain - Primary     Will refer to podiatrist for possible plantar wart, will also check for the possibility of foreign body with an x-ray         Relevant Orders    XR foot 3+ vw right    Ambulatory Referral to Podiatry

## 2024-11-25 NOTE — PROGRESS NOTES
Assessment    1  Encounter for preventive health examination (V70 0) (Z00 00)   2  Bleeding nose (784 7) (R04 0)    Plan  Need for influenza vaccination    · Adacel 5-2-15 5 LF-MCG/0 5 Intramuscular Suspension   · Fluzone Quadrivalent 0 5 ML Intramuscular Suspension Prefilled Syringe    Discussion/Summary  health maintenance visit Prostate cancer screening: prostate cancer screening is current  Colorectal cancer screening: colonoscopy has been ordered and colorectal cancer screening is current  Screening lab work includes glucose, 25-hydroxyvitamin D and lipid profile  The immunizations are needed  Advice and education were given regarding nutrition, self skin examination, aerobic exercise, helmet use and seat belt use  Patient discussion: discussed with the patient  See ent  continue eating healthy and exercise  Chief Complaint  Patient is here for a yearly wellness exam       History of Present Illness  HM, Adult Male: The patient is being seen for a health maintenance evaluation  The last health maintenance visit was 1 year(s) ago  General Health: The patient's health since the last visit is described as good  He has regular dental visits  He denies vision problems  He denies hearing loss  Immunizations status: up to date   needs tdap  Lifestyle:  He consumes a diverse and healthy diet  He does not have any weight concerns  He exercises regularly  He does not use tobacco  He denies alcohol use  He denies drug use  Screening: cancer screening reviewed and current  metabolic screening reviewed and current  risk screening reviewed and current  Cardiovascular risk factors: no hypertension and no diabetes  Safety elements used: seat belt, bicycle helmet, motorcycle helmet, safe driving habits, sunscreen, smoke detector and carbon monoxide detector  HPI: wellness      Review of Systems    Constitutional: no fever, not feeling poorly, no chills and not feeling tired     Eyes: no eye pain, no Check in call made to patient. Received a message that the number is not accepting calls at this time. Sent patient a [employer] message.   eyesight problems, eyes not red and no purulent discharge from the eyes  ENT: no earache, no nosebleeds, no hearing loss and no nasal discharge  Cardiovascular: the heart rate was not slow, no chest pain, the heart rate was not fast and no palpitations  Respiratory: no shortness of breath, no cough, no wheezing and no shortness of breath during exertion  Gastrointestinal: no abdominal pain, no nausea, no constipation and no diarrhea  Genitourinary: no dysuria and no urinary hesitancy  Musculoskeletal: no arthralgias, no joint swelling, no myalgias and no joint stiffness  Integumentary: no rashes, no itching and no skin wound  Active Problems    1  Abdominal bloating (787 3) (R14 0)   2  Encounter for prostate cancer screening (V76 44) (Z12 5)   3  Encounter for screening colonoscopy (V76 51) (Z12 11)   4  Hyperlipidemia (272 4) (E78 5)   5  Umbilical hernia without obstruction and without gangrene (553 1) (K42 9)    Past Medical History    · History of acute sinusitis (V12 69) (Z87 09)   · History of peptic ulcer (V12 71) (Z87 11)   · History of Vitamin D deficiency (268 9) (E55 9)    Surgical History    · History of Lipectomy    Family History  Mother    · Denied: Family history of Colon cancer   · Denied: Family history of colitis   · Denied: Family history of colonic polyps   · Denied: Family history of Crohn's disease   · Denied: Family history of liver disease  Father    · Denied: Family history of Colon cancer   · Family history of cerebrovascular accident (V17 1) (Z82 3)   · Denied: Family history of colitis   · Denied: Family history of colonic polyps   · Denied: Family history of Crohn's disease   · Denied: Family history of liver disease    Social History    · Alcohol use   · Never a smoker   · Occasional alcohol use    Current Meds   1  No Reported Medications Recorded    Allergies    1  No Known Drug Allergies    2  No Known Environmental Allergies   3   No Known Food Allergies    Vitals   Recorded: [id number] 08:19AM   Heart Rate 68   Respiration 16   Systolic 816, RUE, Sitting   Diastolic 70, RUE, Sitting   Height 5 ft 9 1 in   Weight 190 lb 0 2 oz   BMI Calculated 27 98   BSA Calculated 2 02   O2 Saturation 99     Physical Exam    Constitutional   General appearance: No acute distress, well appearing and well nourished  Eyes   Conjunctiva and lids: No swelling, erythema, or discharge  Pupils and irises: Equal, round and reactive to light  Ears, Nose, Mouth, and Throat   External inspection of ears and nose: Normal     Otoscopic examination: Tympanic membrance translucent with normal light reflex  Canals patent without erythema  Oropharynx: Normal with no erythema, edema, exudate or lesions  Pulmonary   Respiratory effort: No increased work of breathing or signs of respiratory distress  Auscultation of lungs: Clear to auscultation  Cardiovascular   Palpation of heart: Normal PMI, no thrills  Auscultation of heart: Normal rate and rhythm, normal S1 and S2, without murmurs  Examination of extremities for edema and/or varicosities: Normal     Abdomen   Abdomen: Non-tender, no masses  Liver and spleen: No hepatomegaly or splenomegaly  Lymphatic   Palpation of lymph nodes in neck: No lymphadenopathy  Musculoskeletal   Gait and station: Normal     Digits and nails: Normal without clubbing or cyanosis  Inspection/palpation of joints, bones, and muscles: Normal     Psychiatric   Orientation to person, place and time: Normal     Mood and affect: Normal        Results/Data  PHQ-2 Adult Depression Screening [id number] 11:29AM User, [employer]     Test Name Result Flag Reference   PHQ-2 Adult Depression Score 0     Over the last two weeks, how often have you been bothered by any of the following problems?   Little interest or pleasure in doing things: Not at all - 0  Feeling down, depressed, or hopeless: Not at all - 0   PHQ-2 Adult Depression Screening Negative         Health Management  Encounter for screening colonoscopy   COLONOSCOPY (GI, SURG); every 5 years; Last [id number]; Next Due: [id number];   Active    Future Appointments    Date/Time Provider Specialty Site   12/06/2018 08:00 AM Raji Read DO Internal Medicine [facility]     Signatures   Electronically signed by : Carley Garcia; Dec 11 2017  9:23AM EST                       (Author)    Electronically signed by : BRENDA Jonhson ; Dec 11 2017  1:49PM EST                       (Review)

## 2025-02-18 ENCOUNTER — OFFICE VISIT (OUTPATIENT)
Dept: INTERNAL MEDICINE CLINIC | Facility: CLINIC | Age: 60
End: 2025-02-18
Payer: COMMERCIAL

## 2025-02-18 VITALS
SYSTOLIC BLOOD PRESSURE: 118 MMHG | TEMPERATURE: 96.1 F | DIASTOLIC BLOOD PRESSURE: 76 MMHG | BODY MASS INDEX: 23.42 KG/M2 | OXYGEN SATURATION: 99 % | WEIGHT: 158.6 LBS | HEART RATE: 65 BPM

## 2025-02-18 DIAGNOSIS — J02.0 STREP PHARYNGITIS: Primary | ICD-10-CM

## 2025-02-18 LAB — S PYO AG THROAT QL: POSITIVE

## 2025-02-18 PROCEDURE — 87880 STREP A ASSAY W/OPTIC: CPT | Performed by: INTERNAL MEDICINE

## 2025-02-18 PROCEDURE — 99213 OFFICE O/P EST LOW 20 MIN: CPT | Performed by: INTERNAL MEDICINE

## 2025-02-18 NOTE — PROGRESS NOTES
Name: Fidencio Roman      : 1965      MRN: [id number]  Encounter Provider: Lea Reyes, MD  Encounter Date: 2025   Encounter department: [facility]    Assessment & Plan  Strep pharyngitis  Appears to have bacterial sinusitis with pharyngitis, strep positive  Start Augmentin  Continue symptom directed treatment  Orders:  •  POCT rapid ANTIGEN strepA  •  amoxicillin-clavulanate (AUGMENTIN) 875-125 mg per tablet; Take 1 tablet by mouth every 12 (twelve) hours for 10 days         History of Present Illness     Almost 2 weeks of nasal congestion, rhinorrhea, sore throat, cough  Went to  10 days ago and suspected to have viral illness  Home COVID test negative x 2  Mucinex and Flonase recommended and have slightly helped  Has grandkids who have had various infections including strep throat    Cough  Associated symptoms include postnasal drip, rhinorrhea and a sore throat. Pertinent negatives include no chills, fever or headaches.     Review of Systems   Constitutional:  Negative for chills and fever.   HENT:  Positive for congestion, postnasal drip, rhinorrhea, sinus pressure and sore throat.    Respiratory:  Positive for cough.    Gastrointestinal:  Negative for diarrhea and vomiting.   Neurological:  Negative for headaches.     Past Medical History:   Diagnosis Date   • Colon polyp    • Hyperlipidemia    • Peptic ulcer     LAST ASSESSED: 14   • Vitamin D deficiency     LAST ASSESSED: 9/17/15     Past Surgical History:   Procedure Laterality Date   • BACK SURGERY     • LIPECTOMY      L5; LAST ASSESSED: 14   • NE COLONOSCOPY FLX DX W/COLLJ SPEC WHEN PFRMD N/A 2016    Procedure: COLONOSCOPY;  Surgeon: Best Garcia MD;  Location: [facility];  Service: Gastroenterology   • REFRACTIVE SURGERY       Family History   Problem Relation Age of Onset   • Stroke Father         CEREBROVASCULAR ACCIDENT   • Dementia Mother    • Seizures Neg Hx      Social History     Tobacco Use    • Smoking status: Never   • Smokeless tobacco: Never   Vaping Use   • Vaping status: Never Used   Substance and Sexual Activity   • Alcohol use: Yes     Comment: occasional    • Drug use: No   • Sexual activity: Yes     Current Outpatient Medications on File Prior to Visit   Medication Sig   • Cholecalciferol (VITAMIN D) 2000 units tablet Take 2,000 Units by mouth daily   • [DISCONTINUED] fluticasone (FLONASE) 50 mcg/act nasal spray 2 sprays into each nostril daily     No Known Allergies  Immunization History   Administered Date(s) Administered   • COVID-19 J&J ([employer]) vaccine 0.5 mL 04/07/2021   • Influenza Injectable, MDCK, Preservative Free, Quadrivalent, 0.5 mL 09/09/2019   • Influenza Quadrivalent Preservative Free 3 years and older IM 12/05/2017   • Influenza, seasonal, injectable 03/13/2011   • Tdap 12/05/2017     Objective   /76 (BP Location: Left arm, Patient Position: Sitting)   Pulse 65   Temp (!) 96.1 °F (35.6 °C)   Wt 71.9 kg (158 lb 9.6 oz)   SpO2 99%   BMI 23.42 kg/m²     Physical Exam  Constitutional:       Appearance: He is well-developed. He is not ill-appearing.   HENT:      Right Ear: Tympanic membrane and ear canal normal.      Left Ear: Tympanic membrane and ear canal normal.      Mouth/Throat:      Pharynx: Posterior oropharyngeal erythema present. No oropharyngeal exudate.   Eyes:      Conjunctiva/sclera: Conjunctivae normal.   Cardiovascular:      Rate and Rhythm: Normal rate and regular rhythm.      Heart sounds: Normal heart sounds. No murmur heard.  Pulmonary:      Effort: Pulmonary effort is normal. No respiratory distress.      Breath sounds: Normal breath sounds. No wheezing or rales.   Musculoskeletal:      Cervical back: Neck supple.   Neurological:      Mental Status: He is alert and oriented to person, place, and time.   Psychiatric:         Mood and Affect: Mood normal.         Behavior: Behavior normal.         Thought Content: Thought content normal.          Judgment: Judgment normal.